# Patient Record
Sex: MALE | Race: OTHER | Employment: FULL TIME | URBAN - METROPOLITAN AREA
[De-identification: names, ages, dates, MRNs, and addresses within clinical notes are randomized per-mention and may not be internally consistent; named-entity substitution may affect disease eponyms.]

---

## 2021-10-08 ENCOUNTER — APPOINTMENT (OUTPATIENT)
Dept: CT IMAGING | Age: 61
DRG: 263 | End: 2021-10-08
Attending: EMERGENCY MEDICINE
Payer: COMMERCIAL

## 2021-10-08 ENCOUNTER — HOSPITAL ENCOUNTER (INPATIENT)
Age: 61
LOS: 6 days | Discharge: HOME | DRG: 263 | End: 2021-10-14
Attending: EMERGENCY MEDICINE | Admitting: HOSPITALIST
Payer: COMMERCIAL

## 2021-10-08 DIAGNOSIS — I86.4: ICD-10-CM

## 2021-10-08 DIAGNOSIS — N17.9 AKI (ACUTE KIDNEY INJURY) (HCC): ICD-10-CM

## 2021-10-08 DIAGNOSIS — K74.60 CIRRHOSIS OF LIVER WITHOUT ASCITES, UNSPECIFIED HEPATIC CIRRHOSIS TYPE (HCC): ICD-10-CM

## 2021-10-08 DIAGNOSIS — K92.2 ACUTE UPPER GI BLEEDING: Primary | ICD-10-CM

## 2021-10-08 LAB
ALBUMIN SERPL-MCNC: 2.8 G/DL (ref 3.5–5)
ALBUMIN/GLOB SERPL: 0.8 {RATIO} (ref 1.1–2.2)
ALP SERPL-CCNC: 51 U/L (ref 45–117)
ALT SERPL-CCNC: 32 U/L (ref 12–78)
ANION GAP SERPL CALC-SCNC: 10 MMOL/L (ref 5–15)
APTT PPP: <20 SEC (ref 22.1–31)
AST SERPL-CCNC: 23 U/L (ref 15–37)
BASE EXCESS BLD CALC-SCNC: 1.2 MMOL/L
BASOPHILS # BLD: 0 K/UL (ref 0–0.1)
BASOPHILS NFR BLD: 0 % (ref 0–1)
BILIRUB SERPL-MCNC: 0.3 MG/DL (ref 0.2–1)
BUN SERPL-MCNC: 56 MG/DL (ref 6–20)
BUN/CREAT SERPL: 41 (ref 12–20)
CA-I BLD-MCNC: 1.15 MMOL/L (ref 1.12–1.32)
CALCIUM SERPL-MCNC: 9 MG/DL (ref 8.5–10.1)
CHLORIDE BLD-SCNC: 97 MMOL/L (ref 100–108)
CHLORIDE SERPL-SCNC: 97 MMOL/L (ref 97–108)
CO2 BLD-SCNC: 27 MMOL/L (ref 19–24)
CO2 SERPL-SCNC: 29 MMOL/L (ref 21–32)
CREAT SERPL-MCNC: 1.36 MG/DL (ref 0.7–1.3)
CREAT UR-MCNC: 0.7 MG/DL (ref 0.6–1.3)
DIFFERENTIAL METHOD BLD: ABNORMAL
EOSINOPHIL # BLD: 0 K/UL (ref 0–0.4)
EOSINOPHIL NFR BLD: 0 % (ref 0–7)
ERYTHROCYTE [DISTWIDTH] IN BLOOD BY AUTOMATED COUNT: 14.5 % (ref 11.5–14.5)
GLOBULIN SER CALC-MCNC: 3.5 G/DL (ref 2–4)
GLUCOSE BLD STRIP.AUTO-MCNC: 171 MG/DL (ref 74–106)
GLUCOSE SERPL-MCNC: 232 MG/DL (ref 65–100)
HCO3 BLDA-SCNC: 26 MMOL/L
HCT VFR BLD AUTO: 21.2 % (ref 36.6–50.3)
HGB BLD-MCNC: 7.2 G/DL (ref 12.1–17)
HISTORY CHECKED?,CKHIST: NORMAL
IMM GRANULOCYTES # BLD AUTO: 0.1 K/UL (ref 0–0.04)
IMM GRANULOCYTES NFR BLD AUTO: 1 % (ref 0–0.5)
INR PPP: 1.1 (ref 0.9–1.1)
LACTATE BLD-SCNC: 7.91 MMOL/L (ref 0.4–2)
LIPASE SERPL-CCNC: 129 U/L (ref 73–393)
LYMPHOCYTES # BLD: 0.9 K/UL (ref 0.8–3.5)
LYMPHOCYTES NFR BLD: 11 % (ref 12–49)
MCH RBC QN AUTO: 29.1 PG (ref 26–34)
MCHC RBC AUTO-ENTMCNC: 34 G/DL (ref 30–36.5)
MCV RBC AUTO: 85.8 FL (ref 80–99)
MONOCYTES # BLD: 0.5 K/UL (ref 0–1)
MONOCYTES NFR BLD: 6 % (ref 5–13)
NEUTS SEG # BLD: 6.7 K/UL (ref 1.8–8)
NEUTS SEG NFR BLD: 82 % (ref 32–75)
NRBC # BLD: 0.02 K/UL (ref 0–0.01)
NRBC BLD-RTO: 0.2 PER 100 WBC
PCO2 BLDV: 43 MMHG (ref 41–51)
PH BLDV: 7.4 [PH] (ref 7.32–7.42)
PLATELET # BLD AUTO: 113 K/UL (ref 150–400)
PMV BLD AUTO: 11.9 FL (ref 8.9–12.9)
PO2 BLDV: 32 MMHG (ref 25–40)
POTASSIUM BLD-SCNC: 2.8 MMOL/L (ref 3.5–5.5)
POTASSIUM SERPL-SCNC: 2.8 MMOL/L (ref 3.5–5.1)
PROT SERPL-MCNC: 6.3 G/DL (ref 6.4–8.2)
PROTHROMBIN TIME: 11.6 SEC (ref 9–11.1)
RBC # BLD AUTO: 2.47 M/UL (ref 4.1–5.7)
SERVICE CMNT-IMP: ABNORMAL
SODIUM BLD-SCNC: 140 MMOL/L (ref 136–145)
SODIUM SERPL-SCNC: 136 MMOL/L (ref 136–145)
SPECIMEN SITE: ABNORMAL
THERAPEUTIC RANGE,PTTT: ABNORMAL SECS (ref 58–77)
TROPONIN-HIGH SENSITIVITY: 8 NG/L (ref 0–76)
WBC # BLD AUTO: 8.2 K/UL (ref 4.1–11.1)

## 2021-10-08 PROCEDURE — 74011000636 HC RX REV CODE- 636: Performed by: EMERGENCY MEDICINE

## 2021-10-08 PROCEDURE — 74011250636 HC RX REV CODE- 250/636: Performed by: EMERGENCY MEDICINE

## 2021-10-08 PROCEDURE — 83690 ASSAY OF LIPASE: CPT

## 2021-10-08 PROCEDURE — 85018 HEMOGLOBIN: CPT

## 2021-10-08 PROCEDURE — 84295 ASSAY OF SERUM SODIUM: CPT

## 2021-10-08 PROCEDURE — 84484 ASSAY OF TROPONIN QUANT: CPT

## 2021-10-08 PROCEDURE — 85730 THROMBOPLASTIN TIME PARTIAL: CPT

## 2021-10-08 PROCEDURE — 93005 ELECTROCARDIOGRAM TRACING: CPT

## 2021-10-08 PROCEDURE — 74011250636 HC RX REV CODE- 250/636: Performed by: HOSPITALIST

## 2021-10-08 PROCEDURE — 85025 COMPLETE CBC W/AUTO DIFF WBC: CPT

## 2021-10-08 PROCEDURE — 86901 BLOOD TYPING SEROLOGIC RH(D): CPT

## 2021-10-08 PROCEDURE — 80053 COMPREHEN METABOLIC PANEL: CPT

## 2021-10-08 PROCEDURE — 74178 CT ABD&PLV WO CNTR FLWD CNTR: CPT

## 2021-10-08 PROCEDURE — C9113 INJ PANTOPRAZOLE SODIUM, VIA: HCPCS | Performed by: EMERGENCY MEDICINE

## 2021-10-08 PROCEDURE — 85610 PROTHROMBIN TIME: CPT

## 2021-10-08 PROCEDURE — 99285 EMERGENCY DEPT VISIT HI MDM: CPT

## 2021-10-08 PROCEDURE — 65270000029 HC RM PRIVATE

## 2021-10-08 PROCEDURE — 74011250637 HC RX REV CODE- 250/637: Performed by: EMERGENCY MEDICINE

## 2021-10-08 PROCEDURE — 74011000258 HC RX REV CODE- 258: Performed by: HOSPITALIST

## 2021-10-08 PROCEDURE — 36415 COLL VENOUS BLD VENIPUNCTURE: CPT

## 2021-10-08 PROCEDURE — 86923 COMPATIBILITY TEST ELECTRIC: CPT

## 2021-10-08 PROCEDURE — 74011636637 HC RX REV CODE- 636/637: Performed by: EMERGENCY MEDICINE

## 2021-10-08 PROCEDURE — 96361 HYDRATE IV INFUSION ADD-ON: CPT

## 2021-10-08 PROCEDURE — 74011000250 HC RX REV CODE- 250: Performed by: EMERGENCY MEDICINE

## 2021-10-08 PROCEDURE — 96375 TX/PRO/DX INJ NEW DRUG ADDON: CPT

## 2021-10-08 PROCEDURE — 96374 THER/PROPH/DIAG INJ IV PUSH: CPT

## 2021-10-08 PROCEDURE — 96376 TX/PRO/DX INJ SAME DRUG ADON: CPT

## 2021-10-08 RX ORDER — DEXTROSE 50 % IN WATER (D50W) INTRAVENOUS SYRINGE
12.5-25 AS NEEDED
Status: DISCONTINUED | OUTPATIENT
Start: 2021-10-08 | End: 2021-10-14 | Stop reason: HOSPADM

## 2021-10-08 RX ORDER — SODIUM CHLORIDE 9 MG/ML
250 INJECTION, SOLUTION INTRAVENOUS AS NEEDED
Status: DISCONTINUED | OUTPATIENT
Start: 2021-10-08 | End: 2021-10-09

## 2021-10-08 RX ORDER — ACETAMINOPHEN 325 MG/1
325 TABLET ORAL
Status: DISCONTINUED | OUTPATIENT
Start: 2021-10-08 | End: 2021-10-14 | Stop reason: HOSPADM

## 2021-10-08 RX ORDER — POTASSIUM CHLORIDE 750 MG/1
40 TABLET, FILM COATED, EXTENDED RELEASE ORAL
Status: COMPLETED | OUTPATIENT
Start: 2021-10-08 | End: 2021-10-08

## 2021-10-08 RX ORDER — ONDANSETRON 2 MG/ML
4 INJECTION INTRAMUSCULAR; INTRAVENOUS
Status: DISCONTINUED | OUTPATIENT
Start: 2021-10-08 | End: 2021-10-14 | Stop reason: HOSPADM

## 2021-10-08 RX ORDER — OCTREOTIDE ACETATE 100 UG/ML
50 INJECTION, SOLUTION INTRAVENOUS; SUBCUTANEOUS ONCE
Status: COMPLETED | OUTPATIENT
Start: 2021-10-08 | End: 2021-10-08

## 2021-10-08 RX ORDER — SODIUM CHLORIDE 9 MG/ML
50 INJECTION, SOLUTION INTRAVENOUS CONTINUOUS
Status: DISPENSED | OUTPATIENT
Start: 2021-10-08 | End: 2021-10-12

## 2021-10-08 RX ORDER — ONDANSETRON 2 MG/ML
4 INJECTION INTRAMUSCULAR; INTRAVENOUS
Status: COMPLETED | OUTPATIENT
Start: 2021-10-08 | End: 2021-10-08

## 2021-10-08 RX ORDER — MAGNESIUM SULFATE 100 %
4 CRYSTALS MISCELLANEOUS AS NEEDED
Status: DISCONTINUED | OUTPATIENT
Start: 2021-10-08 | End: 2021-10-14 | Stop reason: HOSPADM

## 2021-10-08 RX ORDER — INSULIN LISPRO 100 [IU]/ML
INJECTION, SOLUTION INTRAVENOUS; SUBCUTANEOUS EVERY 6 HOURS
Status: DISCONTINUED | OUTPATIENT
Start: 2021-10-09 | End: 2021-10-12

## 2021-10-08 RX ORDER — METOCLOPRAMIDE HYDROCHLORIDE 5 MG/ML
10 INJECTION INTRAMUSCULAR; INTRAVENOUS
Status: COMPLETED | OUTPATIENT
Start: 2021-10-08 | End: 2021-10-08

## 2021-10-08 RX ADMIN — ONDANSETRON 4 MG: 2 INJECTION INTRAMUSCULAR; INTRAVENOUS at 19:14

## 2021-10-08 RX ADMIN — CEFTRIAXONE 1 G: 1 INJECTION, POWDER, FOR SOLUTION INTRAMUSCULAR; INTRAVENOUS at 23:25

## 2021-10-08 RX ADMIN — SODIUM CHLORIDE 1000 ML: 9 INJECTION, SOLUTION INTRAVENOUS at 20:15

## 2021-10-08 RX ADMIN — METOCLOPRAMIDE 10 MG: 5 INJECTION, SOLUTION INTRAMUSCULAR; INTRAVENOUS at 19:14

## 2021-10-08 RX ADMIN — IOPAMIDOL 100 ML: 755 INJECTION, SOLUTION INTRAVENOUS at 20:54

## 2021-10-08 RX ADMIN — SODIUM CHLORIDE 40 MG: 9 INJECTION, SOLUTION INTRAMUSCULAR; INTRAVENOUS; SUBCUTANEOUS at 19:14

## 2021-10-08 RX ADMIN — OCTREOTIDE ACETATE 50 MCG/HR: 500 INJECTION, SOLUTION INTRAVENOUS; SUBCUTANEOUS at 20:16

## 2021-10-08 RX ADMIN — SODIUM CHLORIDE 1000 ML: 9 INJECTION, SOLUTION INTRAVENOUS at 19:14

## 2021-10-08 RX ADMIN — OCTREOTIDE ACETATE 50 MCG: 100 INJECTION, SOLUTION INTRAVENOUS; SUBCUTANEOUS at 18:14

## 2021-10-08 RX ADMIN — POTASSIUM CHLORIDE 40 MEQ: 750 TABLET, FILM COATED, EXTENDED RELEASE ORAL at 22:39

## 2021-10-08 RX ADMIN — SODIUM CHLORIDE 50 ML/HR: 9 INJECTION, SOLUTION INTRAVENOUS at 23:30

## 2021-10-08 NOTE — ED PROVIDER NOTES
EMERGENCY DEPARTMENT HISTORY AND PHYSICAL EXAM      Please note that this dictation was completed with the assistance of \"Dragon\", the computer voice recognition software. Quite often unanticipated grammatical, syntax, homophones, and other interpretive errors are inadvertently transcribed by the computer software. Please disregard these errors and any errors that have escaped final proofreading. Thank you. Patient: Bg Rosas  DOS: 10/09/21  : 1960  MRN: [id number]  History of Presenting Illness     Chief Complaint   Patient presents with    Vomiting     vomited dark red blood; pt pale hypotensive en route. he ha shad two days abd pain. 79/48; has an  IV has had 900ml IV fluid     History Provided By: Patient/family/EMS (if applicable)    HPI: Bg Rosas, 59 y.o. male with past medical history as documented below presents to the ED with c/o of  Pt denies any other exacerbating or ameliorating factors. Additionally, pt specifically denies any recent fever, chills, headache, nausea, vomiting, abdominal pain, CP, SOB, lightheadedness, dizziness, numbness, weakness, lower extremity swelling, heart palpitations, urinary sxs, diarrhea, constipation, melena, hematochezia, cough, or congestion. There are no other complaints, changes or physical findings pertinent to the HPI at this time. PCP: None  Past History   Past Medical History:  Cirrhosis    Past Surgical History:  No past surgical history on file. Family History:   Family history reviewed and was non-contributory, unless specified below:  No family history on file. Social History:  Social History     Tobacco Use    Smoking status: Not on file   Substance Use Topics    Alcohol use: Not on file    Drug use: Not on file       Allergies:  No Known Allergies    Current Medications:  No current facility-administered medications on file prior to encounter. No current outpatient medications on file prior to encounter. Review of Systems   A complete ROS was reviewed by me today and all other systems negative, unless otherwise specified below:  Review of Systems   Constitutional: Negative. Negative for chills and fever. HENT: Negative. Negative for congestion and sore throat. Eyes: Negative. Respiratory: Negative. Negative for cough, chest tightness, shortness of breath and wheezing. Cardiovascular: Negative. Negative for chest pain, palpitations and leg swelling. Gastrointestinal: Negative. Negative for abdominal distention, abdominal pain, blood in stool, constipation, diarrhea, nausea and vomiting. Endocrine: Negative. Genitourinary: Negative. Negative for dysuria, flank pain, frequency, hematuria and urgency. Musculoskeletal: Negative. Negative for arthralgias, back pain and myalgias. Skin: Negative. Negative for color change and rash. Neurological: Negative. Negative for dizziness, syncope, speech difficulty, weakness, light-headedness, numbness and headaches. Hematological: Negative. Psychiatric/Behavioral: Negative. Negative for confusion and self-injury. The patient is not nervous/anxious. All other systems reviewed and are negative. Physical Exam   Physical Exam  Vitals and nursing note reviewed. Constitutional:       Appearance: He is well-developed. He is not toxic-appearing. HENT:      Head: Normocephalic and atraumatic. Mouth/Throat:      Pharynx: No posterior oropharyngeal erythema. Eyes:      Conjunctiva/sclera: Conjunctivae normal.   Cardiovascular:      Rate and Rhythm: Normal rate and regular rhythm. Heart sounds: Normal heart sounds. No murmur heard. No friction rub. No gallop. Pulmonary:      Effort: Pulmonary effort is normal. No respiratory distress. Breath sounds: Normal breath sounds. No wheezing or rales. Chest:      Chest wall: No tenderness. Abdominal:      General: Bowel sounds are normal. There is no distension. Palpations: Abdomen is soft. There is no mass. Tenderness: There is no abdominal tenderness. There is no guarding or rebound. Musculoskeletal:         General: Normal range of motion. Cervical back: Normal range of motion. Skin:     General: Skin is warm. Neurological:      General: No focal deficit present. Mental Status: He is alert and oriented to person, place, and time. Motor: No abnormal muscle tone. Psychiatric:         Behavior: Behavior is cooperative. Diagnostic Study Results     Laboratory Data  I have personally reviewed and interpreted all available laboratory results. Recent Results (from the past 24 hour(s))   CBC WITH AUTOMATED DIFF    Collection Time: 10/08/21  5:58 PM   Result Value Ref Range    WBC 8.2 4.1 - 11.1 K/uL    RBC 2.47 (L) 4.10 - 5.70 M/uL    HGB 7.2 (L) 12.1 - 17.0 g/dL    HCT 21.2 (L) 36.6 - 50.3 %    MCV 85.8 80.0 - 99.0 FL    MCH 29.1 26.0 - 34.0 PG    MCHC 34.0 30.0 - 36.5 g/dL    RDW 14.5 11.5 - 14.5 %    PLATELET 377 (L) 357 - 400 K/uL    MPV 11.9 8.9 - 12.9 FL    NRBC 0.2 (H) 0  WBC    ABSOLUTE NRBC 0.02 (H) 0.00 - 0.01 K/uL    NEUTROPHILS 82 (H) 32 - 75 %    LYMPHOCYTES 11 (L) 12 - 49 %    MONOCYTES 6 5 - 13 %    EOSINOPHILS 0 0 - 7 %    BASOPHILS 0 0 - 1 %    IMMATURE GRANULOCYTES 1 (H) 0.0 - 0.5 %    ABS. NEUTROPHILS 6.7 1.8 - 8.0 K/UL    ABS. LYMPHOCYTES 0.9 0.8 - 3.5 K/UL    ABS. MONOCYTES 0.5 0.0 - 1.0 K/UL    ABS. EOSINOPHILS 0.0 0.0 - 0.4 K/UL    ABS. BASOPHILS 0.0 0.0 - 0.1 K/UL    ABS. IMM.  GRANS. 0.1 (H) 0.00 - 0.04 K/UL    DF AUTOMATED     METABOLIC PANEL, COMPREHENSIVE    Collection Time: 10/08/21  5:58 PM   Result Value Ref Range    Sodium 136 136 - 145 mmol/L    Potassium 2.8 (L) 3.5 - 5.1 mmol/L    Chloride 97 97 - 108 mmol/L    CO2 29 21 - 32 mmol/L    Anion gap 10 5 - 15 mmol/L    Glucose 232 (H) 65 - 100 mg/dL    BUN 56 (H) 6 - 20 MG/DL    Creatinine 1.36 (H) 0.70 - 1.30 MG/DL    BUN/Creatinine ratio 41 (H) 12 - 20 GFR est AA >60 >60 ml/min/1.73m2    GFR est non-AA 53 (L) >60 ml/min/1.73m2    Calcium 9.0 8.5 - 10.1 MG/DL    Bilirubin, total 0.3 0.2 - 1.0 MG/DL    ALT (SGPT) 32 12 - 78 U/L    AST (SGOT) 23 15 - 37 U/L    Alk. phosphatase 51 45 - 117 U/L    Protein, total 6.3 (L) 6.4 - 8.2 g/dL    Albumin 2.8 (L) 3.5 - 5.0 g/dL    Globulin 3.5 2.0 - 4.0 g/dL    A-G Ratio 0.8 (L) 1.1 - 2.2     LIPASE    Collection Time: 10/08/21  5:58 PM   Result Value Ref Range    Lipase 129 73 - 393 U/L   TROPONIN-HIGH SENSITIVITY    Collection Time: 10/08/21  5:58 PM   Result Value Ref Range    Troponin-High Sensitivity 8 0 - 76 ng/L   RBC, ALLOCATE    Collection Time: 10/08/21  6:00 PM   Result Value Ref Range    HISTORY CHECKED?  Historical check performed    TYPE & SCREEN    Collection Time: 10/08/21  6:08 PM   Result Value Ref Range    Crossmatch Expiration 10/11/2021,2359     ABO/Rh(D) A POSITIVE     Antibody screen NEG     Unit number [id number]     Blood component type RC LR     Unit division 00     Status of unit ALLOCATED     Crossmatch result Compatible    EKG, 12 LEAD, INITIAL    Collection Time: 10/08/21  7:45 PM   Result Value Ref Range    Ventricular Rate 79 BPM    Atrial Rate 79 BPM    P-R Interval 152 ms    QRS Duration 150 ms    Q-T Interval 502 ms    QTC Calculation (Bezet) 575 ms    Calculated P Axis 40 degrees    Calculated R Axis -4 degrees    Calculated T Axis 20 degrees    Diagnosis       Normal sinus rhythm  Right bundle branch block  No previous ECGs available     BLOOD GAS,CHEM8,LACTIC ACID POC    Collection Time: 10/08/21  9:46 PM   Result Value Ref Range    Calcium, ionized (POC) 1.15 1.12 - 1.32 mmol/L    BICARBONATE 26 mmol/L    Base excess (POC) 1.2 mmol/L    Sample source VENOUS BLOOD      CO2, POC 27 (H) 19 - 24 MMOL/L    Sodium,  136 - 145 MMOL/L    Potassium, POC 2.8 (LL) 3.5 - 5.5 MMOL/L    Chloride, POC 97 (L) 100 - 108 MMOL/L    Glucose,  (H) 74 - 106 MG/DL    Creatinine, POC 0.7 0.6 - 1.3 MG/DL    Lactic Acid (POC) 7.91 (HH) 0.40 - 2.00 mmol/L    Critical value read back Diana Amaro MD     pH, venous (POC) 7.40 7.32 - 7.42      pCO2, venous (POC) 43.0 41 - 51 MMHG    pO2, venous (POC) 32 25 - 40 mmHg   PROTHROMBIN TIME + INR    Collection Time: 10/08/21  9:51 PM   Result Value Ref Range    INR 1.1 0.9 - 1.1      Prothrombin time 11.6 (H) 9.0 - 11.1 sec   PTT    Collection Time: 10/08/21  9:51 PM   Result Value Ref Range    aPTT <20.0 (L) 22.1 - 31.0 sec    aPTT, therapeutic range     58.0 - 77.0 SECS   HGB & HCT    Collection Time: 10/08/21 11:33 PM   Result Value Ref Range    HGB 6.2 (L) 12.1 - 17.0 g/dL    HCT 17.7 (LL) 36.6 - 50.3 %       Radiologic Studies   I have personally reviewed and interpreted all available imaging studies and agree with radiology interpretation. 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 No evidence of active GI bleed. Cirrhosis. 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 No evidence of active GI bleed. Cirrhosis. Narrative:  INDICATION: Acute GI bleed. Hematemesis. History of cirrhosis. COMPARISON: None       TECHNIQUE: Thin axial images are obtained through the abdomen and pelvis. Then,   following the uneventful intravenous administration of 100 cc Isovue-370, 5 mm   axial images were obtained through the abdomen and pelvis in hepatic arterial   and portal venous phases. CT dose reduction was achieved through use of a   standardized protocol tailored for this examination and automatic exposure   control for dose modulation. FINDINGS:    LUNG BASES: Clear. INCIDENTALLY IMAGED HEART AND MEDIASTINUM: Unremarkable. LIVER: Cirrhotic, with no visualized mass. GALLBLADDER: Unremarkable. SPLEEN: No mass. PANCREAS: No mass or ductal dilatation. ADRENALS: Unremarkable. KIDNEYS: No mass, calculus, or hydronephrosis. 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 SMALL BOWEL: No dilatation or wall thickening. No active bleed. COLON: No dilatation or wall thickening. No active bleed. APPENDIX: Normal.   PERITONEUM: No ascites or pneumoperitoneum. RETROPERITONEUM: No lymphadenopathy or aortic aneurysm. REPRODUCTIVE ORGANS: Unremarkable. URINARY BLADDER: No mass or calculus. BONES: No destructive bone lesion. ADDITIONAL COMMENTS: N/A               CXR Results  (Last 48 hours)    None        Medical Decision Making   I am the first and primary ED physician for this patient's ED visit today. I reviewed our electronic medical record system for any past medical records that may contribute to the patient's current condition, including their past medical history, surgical history, social and family history. This also includes their most recent ED visits, previous hospitalizations and prior diagnostic data. I have reviewed and summarized the most pertinent findings in my HPI and MDM.     Vital Signs Reviewed:  Patient Vitals for the past 24 hrs:   Temp Pulse Resp BP SpO2   10/09/21 0030  93 15 105/64    10/09/21 0015  94 15 97/62    10/09/21 0000  93 14 109/67    10/08/21 2345  92 14 104/66 92 %   10/08/21 2330  93 15 108/68    10/08/21 2329  92 18  94 %   10/08/21 2315  88 14 (!) 100/57 92 %   10/08/21 2300  88 14 108/61 95 %   10/08/21 2245  92 17 108/67 97 %   10/08/21 2230  83 14 (!) 105/55 94 %   10/08/21 2215  82 18 (!) 93/59 96 %   10/08/21 2200  81 16 99/63 93 %   10/08/21 2145  85 17 97/65 91 %   10/08/21 2137  82 15  95 %   10/08/21 2134  82 13 96/62 93 %   10/08/21 2031  82 15  91 %   10/08/21 2030  82 14 104/63    10/08/21 1900  74 16 100/64 93 %   10/08/21 1845  74 13 (!) 82/72    10/08/21 1830  76 15 (!) 123/55 95 %   10/08/21 1757 97.6 °F (36.4 °C)    100 %   10/08/21 1738 97.8 °F (36.6 °C) 70 18 (!) 79/48 100 % Records Reviewed: Nursing Notes, Old Medical Records, Previous electrocardiograms, Previous Radiology Studies and Previous Laboratory Studies, EMS reports    Provider Notes (Medical Decision Making):       ED Course:   Initial assessment performed. I discussed presenting problems and concerns, and my formulated plan for today's visit with the patient and any available family members. I have encouraged them to ask questions as they arise throughout the visit.    Social History     Tobacco Use    Smoking status: Not on file   Substance Use Topics    Alcohol use: Not on file    Drug use: Not on file       ED Orders Placed:  Orders Placed This Encounter    CT ABD PELV W WO CONT    CBC WITH AUTOMATED DIFF    METABOLIC PANEL, COMPREHENSIVE    LIPASE    TROPONIN-HIGH SENSITIVITY    OCCULT BLOOD, STOOL    PT    PTT    METABOLIC PANEL, BASIC    CBC W/O DIFF    MAGNESIUM    PHOSPHORUS    HGB & HCT    DIET NPO    TRANSFUSE PACKED RBC'S, 1 Units    POC LACTIC ACID    Vital signs    CARDIAC MONITORING    POC GLUCOSE    VITAL SIGNS PER UNIT ROUTINE    OUT OF BED WITH ASSISTANCE    IP CONSULT TO GASTROENTEROLOGY    IP CONSULT TO GASTROENTEROLOGY    POC CHEM8    BLOOD GAS,CHEM8,LACTIC ACID POC    EKG 12 LEAD INITIAL    RBC, ALLOCATE, 1 Units Date needed for transfusion: 10/8/2021    TYPE & SCREEN    INSERT PERIPHERAL IV ONE TIME STAT    INSERT PERIPHERAL IV ONE TIME STAT    pantoprazole (PROTONIX) 40 mg in 0.9% sodium chloride 10 mL injection    sodium chloride 0.9 % bolus infusion 1,000 mL    0.9% sodium chloride infusion 250 mL    0.9% sodium chloride infusion 250 mL    ondansetron (ZOFRAN) injection 4 mg    octreotide (SANDOSTATIN) 500 mcg in 0.9% sodium chloride 500 mL infusion    sodium chloride 0.9 % bolus infusion 1,000 mL    metoclopramide HCl (REGLAN) injection 10 mg    octreotide (SANDOSTATIN) injection 50 mcg    iopamidoL (ISOVUE-370) 76 % injection 100 mL    pantoprazole (PROTONIX) 40 mg in 0.9% sodium chloride 10 mL injection    potassium chloride SR (KLOR-CON 10) tablet 40 mEq    insulin lispro (HUMALOG) injection    glucose chewable tablet 16 g    dextrose (D50W) injection syrg 12.5-25 g    glucagon (GLUCAGEN) injection 1 mg    acetaminophen (TYLENOL) tablet 325 mg    ondansetron (ZOFRAN) injection 4 mg    cefTRIAXone (ROCEPHIN) 1 g in 0.9% sodium chloride (MBP/ADV) 50 mL MBP    0.9% sodium chloride infusion    INITIAL PHYSICIAN ORDER: INPATIENT Telemetry; Yes; 3.  Patient receiving treatment that can only be provided in an inpatient setting (further clarification in H&P documentation)    Pharmacy to Dose Consult       ED Medications Administered During ED Course:  Medications   0.9% sodium chloride infusion 250 mL (has no administration in time range)   0.9% sodium chloride infusion 250 mL (has no administration in time range)   octreotide (SANDOSTATIN) 500 mcg in 0.9% sodium chloride 500 mL infusion (50 mcg/hr IntraVENous New Bag 10/8/21 2016)   pantoprazole (PROTONIX) 40 mg in 0.9% sodium chloride 10 mL injection (has no administration in time range)   insulin lispro (HUMALOG) injection (has no administration in time range)   glucose chewable tablet 16 g (has no administration in time range)   dextrose (D50W) injection syrg 12.5-25 g (has no administration in time range)   glucagon (GLUCAGEN) injection 1 mg (has no administration in time range)   acetaminophen (TYLENOL) tablet 325 mg (has no administration in time range)   ondansetron (ZOFRAN) injection 4 mg (has no administration in time range)   cefTRIAXone (ROCEPHIN) 1 g in 0.9% sodium chloride (MBP/ADV) 50 mL MBP (0 g IntraVENous IV Completed 10/8/21 2355)   0.9% sodium chloride infusion (50 mL/hr IntraVENous New Bag 10/8/21 2330)   pantoprazole (PROTONIX) 40 mg in 0.9% sodium chloride 10 mL injection (40 mg IntraVENous Given 10/8/21 1914)   sodium chloride 0.9 % bolus infusion 1,000 mL (0 mL IntraVENous IV Completed 10/8/21 2218)   ondansetron (ZOFRAN) injection 4 mg (4 mg IntraVENous Given 10/8/21 1914)   sodium chloride 0.9 % bolus infusion 1,000 mL (0 mL IntraVENous IV Completed 10/8/21 2006)   metoclopramide HCl (REGLAN) injection 10 mg (10 mg IntraVENous Given 10/8/21 1914)   octreotide (SANDOSTATIN) injection 50 mcg (50 mcg IntraVENous Given 10/8/21 1814)   iopamidoL (ISOVUE-370) 76 % injection 100 mL (100 mL IntraVENous Given 10/8/21 2054)   potassium chloride SR (KLOR-CON 10) tablet 40 mEq (40 mEq Oral Given 10/8/21 2239)        Progress Note:  Consult Note:  Phill Hooker MD spoke with Dr. Dalila Emanuel  Specialty: GI   Discussed pt's hx, disposition, and available diagnostic and imaging results. Reviewed care plans. Agree with management and plan thus far. Consultant will evaluate pt. Agree with Octreotide bolus, gtt, IV Protonix, resuscitation. Consult Note:  Phill Hooker MD spoke with   Specialty: Hospitalist  Discussed pt's hx, disposition, and available diagnostic and imaging results. Reviewed care plans. Agree with management and plan thus far. Consultant will evaluate pt.    minutes of critical care time with this patient. This is time spent at this critically ill patient's bedside actively involved in patient care as well as the coordination of care and discussions with the patient's family. This includes time involved in lab review, consultations with specialist, family decision-making, bedside attention and documentation. During this entire length of time I was immediately available to the patient. This does not include time spent on separately reported billable procedures. Critical Care: The reason for providing this level of medical care for this critically-ill patient was due to a critical illness that impaired one or more vital organ systems, such that there was a high probability of imminent or life-threatening deterioration in the patient's condition.  This care involved the highest level of preparedness to intervene urgently. This care involved high complexity decision making to assess, manipulate, and support vital system functions, to treat this degree of vital organ system failure, and to prevent further life threatening deterioration of the patients condition requiring frequent assessments and interventions. Pamela Torres MD    ADMIT  Given the patient's current clinical presentation, I have a high level of concern for decompensation if discharged from the emergency department. Patient is being admitted to the hospital.  The results of their tests and reasons for their admission have been discussed with them and/or available family. They convey agreement and understanding for the need to be admitted and for their admission diagnosis. Consultation has been made with the inpatient physician specialist for hospitalization. Diagnosis   Clinical Impression:  1. Acute upper GI bleeding    2. [place] (acute kidney injury) ([facility] [address]    3. Cirrhosis of liver without ascites, unspecified hepatic cirrhosis type (HCC)      Attestation:  Sj Wheatley MD, am the attending of record for this patient. I personally performed the services described in this documentation on this date, 10/8/2021 for patientBg. I have reviewed the chart and verified that the record is accurate and complete.

## 2021-10-09 ENCOUNTER — ANESTHESIA EVENT (OUTPATIENT)
Dept: SURGERY | Age: 61
DRG: 263 | End: 2021-10-09
Payer: COMMERCIAL

## 2021-10-09 ENCOUNTER — ANESTHESIA (OUTPATIENT)
Dept: SURGERY | Age: 61
DRG: 263 | End: 2021-10-09
Payer: COMMERCIAL

## 2021-10-09 LAB
ANION GAP SERPL CALC-SCNC: 0 MMOL/L (ref 5–15)
ATRIAL RATE: 79 BPM
BUN SERPL-MCNC: 42 MG/DL (ref 6–20)
BUN/CREAT SERPL: 38 (ref 12–20)
CALCIUM SERPL-MCNC: 7.7 MG/DL (ref 8.5–10.1)
CALCULATED P AXIS, ECG09: 40 DEGREES
CALCULATED R AXIS, ECG10: -4 DEGREES
CALCULATED T AXIS, ECG11: 20 DEGREES
CHLORIDE SERPL-SCNC: 108 MMOL/L (ref 97–108)
CO2 SERPL-SCNC: 33 MMOL/L (ref 21–32)
COVID-19 RAPID TEST, COVR: NOT DETECTED
CREAT SERPL-MCNC: 1.12 MG/DL (ref 0.7–1.3)
DIAGNOSIS, 93000: NORMAL
ERYTHROCYTE [DISTWIDTH] IN BLOOD BY AUTOMATED COUNT: 15.7 % (ref 11.5–14.5)
GLUCOSE BLD STRIP.AUTO-MCNC: 192 MG/DL (ref 65–117)
GLUCOSE BLD STRIP.AUTO-MCNC: 197 MG/DL (ref 65–117)
GLUCOSE BLD STRIP.AUTO-MCNC: 213 MG/DL (ref 65–117)
GLUCOSE BLD STRIP.AUTO-MCNC: 216 MG/DL (ref 65–117)
GLUCOSE BLD STRIP.AUTO-MCNC: 225 MG/DL (ref 65–117)
GLUCOSE SERPL-MCNC: 198 MG/DL (ref 65–100)
HCT VFR BLD AUTO: 16.8 % (ref 36.6–50.3)
HCT VFR BLD AUTO: 17.7 % (ref 36.6–50.3)
HCT VFR BLD AUTO: 18.6 % (ref 36.6–50.3)
HEMOCCULT STL QL: POSITIVE
HGB BLD-MCNC: 5.6 G/DL (ref 12.1–17)
HGB BLD-MCNC: 6.2 G/DL (ref 12.1–17)
HGB BLD-MCNC: 6.3 G/DL (ref 12.1–17)
HISTORY CHECKED?,CKHIST: NORMAL
MAGNESIUM SERPL-MCNC: 2 MG/DL (ref 1.6–2.4)
MCH RBC QN AUTO: 29 PG (ref 26–34)
MCHC RBC AUTO-ENTMCNC: 33.9 G/DL (ref 30–36.5)
MCV RBC AUTO: 85.7 FL (ref 80–99)
NRBC # BLD: 0.06 K/UL (ref 0–0.01)
NRBC BLD-RTO: 1.4 PER 100 WBC
P-R INTERVAL, ECG05: 152 MS
PHOSPHATE SERPL-MCNC: 2.6 MG/DL (ref 2.6–4.7)
PLATELET # BLD AUTO: 85 K/UL (ref 150–400)
PMV BLD AUTO: 11.5 FL (ref 8.9–12.9)
POTASSIUM SERPL-SCNC: 3.3 MMOL/L (ref 3.5–5.1)
Q-T INTERVAL, ECG07: 502 MS
QRS DURATION, ECG06: 150 MS
QTC CALCULATION (BEZET), ECG08: 575 MS
RBC # BLD AUTO: 2.17 M/UL (ref 4.1–5.7)
SERVICE CMNT-IMP: ABNORMAL
SODIUM SERPL-SCNC: 141 MMOL/L (ref 136–145)
SOURCE, COVRS: NORMAL
VENTRICULAR RATE, ECG03: 79 BPM
WBC # BLD AUTO: 4.3 K/UL (ref 4.1–11.1)

## 2021-10-09 PROCEDURE — 87635 SARS-COV-2 COVID-19 AMP PRB: CPT

## 2021-10-09 PROCEDURE — 30233N1 TRANSFUSION OF NONAUTOLOGOUS RED BLOOD CELLS INTO PERIPHERAL VEIN, PERCUTANEOUS APPROACH: ICD-10-PCS | Performed by: INTERNAL MEDICINE

## 2021-10-09 PROCEDURE — 74011250636 HC RX REV CODE- 250/636: Performed by: NURSE PRACTITIONER

## 2021-10-09 PROCEDURE — 82962 GLUCOSE BLOOD TEST: CPT

## 2021-10-09 PROCEDURE — 74011000258 HC RX REV CODE- 258: Performed by: INTERNAL MEDICINE

## 2021-10-09 PROCEDURE — C9113 INJ PANTOPRAZOLE SODIUM, VIA: HCPCS | Performed by: HOSPITALIST

## 2021-10-09 PROCEDURE — 74011000250 HC RX REV CODE- 250: Performed by: HOSPITALIST

## 2021-10-09 PROCEDURE — 84100 ASSAY OF PHOSPHORUS: CPT

## 2021-10-09 PROCEDURE — 2709999900 HC NON-CHARGEABLE SUPPLY

## 2021-10-09 PROCEDURE — P9016 RBC LEUKOCYTES REDUCED: HCPCS

## 2021-10-09 PROCEDURE — 74011250636 HC RX REV CODE- 250/636: Performed by: EMERGENCY MEDICINE

## 2021-10-09 PROCEDURE — 74011636637 HC RX REV CODE- 636/637: Performed by: EMERGENCY MEDICINE

## 2021-10-09 PROCEDURE — 74011250636 HC RX REV CODE- 250/636: Performed by: HOSPITALIST

## 2021-10-09 PROCEDURE — 83735 ASSAY OF MAGNESIUM: CPT

## 2021-10-09 PROCEDURE — 82272 OCCULT BLD FECES 1-3 TESTS: CPT

## 2021-10-09 PROCEDURE — 74011250636 HC RX REV CODE- 250/636: Performed by: INTERNAL MEDICINE

## 2021-10-09 PROCEDURE — 65660000000 HC RM CCU STEPDOWN

## 2021-10-09 PROCEDURE — 85027 COMPLETE CBC AUTOMATED: CPT

## 2021-10-09 PROCEDURE — 36430 TRANSFUSION BLD/BLD COMPNT: CPT

## 2021-10-09 PROCEDURE — 80048 BASIC METABOLIC PNL TOTAL CA: CPT

## 2021-10-09 PROCEDURE — 36415 COLL VENOUS BLD VENIPUNCTURE: CPT

## 2021-10-09 PROCEDURE — 85018 HEMOGLOBIN: CPT

## 2021-10-09 PROCEDURE — 74011636637 HC RX REV CODE- 636/637: Performed by: HOSPITALIST

## 2021-10-09 RX ORDER — BISMUTH SUBSALICYLATE 262 MG/1
2 TABLET, CHEWABLE ORAL
COMMUNITY

## 2021-10-09 RX ORDER — POTASSIUM CHLORIDE 14.9 MG/ML
10 INJECTION INTRAVENOUS
Status: DISCONTINUED | OUTPATIENT
Start: 2021-10-09 | End: 2021-10-09 | Stop reason: SDUPTHER

## 2021-10-09 RX ORDER — ASPIRIN 81 MG/1
81 TABLET ORAL DAILY
COMMUNITY
End: 2021-10-14

## 2021-10-09 RX ORDER — PIOGLITAZONEHYDROCHLORIDE 45 MG/1
45 TABLET ORAL DAILY
COMMUNITY

## 2021-10-09 RX ORDER — MORPHINE SULFATE 2 MG/ML
1 INJECTION, SOLUTION INTRAMUSCULAR; INTRAVENOUS ONCE
Status: COMPLETED | OUTPATIENT
Start: 2021-10-09 | End: 2021-10-09

## 2021-10-09 RX ORDER — TORSEMIDE 20 MG/1
20 TABLET ORAL DAILY
COMMUNITY

## 2021-10-09 RX ORDER — POTASSIUM CHLORIDE 7.45 MG/ML
10 INJECTION INTRAVENOUS
Status: COMPLETED | OUTPATIENT
Start: 2021-10-09 | End: 2021-10-10

## 2021-10-09 RX ORDER — SODIUM CHLORIDE 9 MG/ML
250 INJECTION, SOLUTION INTRAVENOUS AS NEEDED
Status: DISCONTINUED | OUTPATIENT
Start: 2021-10-09 | End: 2021-10-09

## 2021-10-09 RX ORDER — POTASSIUM CHLORIDE 7.45 MG/ML
10 INJECTION INTRAVENOUS
Status: COMPLETED | OUTPATIENT
Start: 2021-10-09 | End: 2021-10-09

## 2021-10-09 RX ORDER — ATORVASTATIN CALCIUM 40 MG/1
40 TABLET, FILM COATED ORAL DAILY
COMMUNITY

## 2021-10-09 RX ORDER — AMLODIPINE BESYLATE 10 MG/1
10 TABLET ORAL DAILY
COMMUNITY

## 2021-10-09 RX ORDER — SODIUM CHLORIDE 9 MG/ML
250 INJECTION, SOLUTION INTRAVENOUS AS NEEDED
Status: DISCONTINUED | OUTPATIENT
Start: 2021-10-09 | End: 2021-10-10 | Stop reason: ALTCHOICE

## 2021-10-09 RX ORDER — OMEPRAZOLE 40 MG/1
40 CAPSULE, DELAYED RELEASE ORAL DAILY
COMMUNITY

## 2021-10-09 RX ORDER — METOCLOPRAMIDE 5 MG/1
5 TABLET ORAL
COMMUNITY

## 2021-10-09 RX ORDER — METFORMIN HYDROCHLORIDE 1000 MG/1
1000 TABLET ORAL 2 TIMES DAILY WITH MEALS
COMMUNITY

## 2021-10-09 RX ORDER — BETAMETHASONE DIPROPIONATE 0.5 MG/G
1 CREAM TOPICAL
COMMUNITY

## 2021-10-09 RX ADMIN — SODIUM CHLORIDE 40 MG: 9 INJECTION INTRAMUSCULAR; INTRAVENOUS; SUBCUTANEOUS at 21:25

## 2021-10-09 RX ADMIN — POTASSIUM CHLORIDE 10 MEQ: 10 INJECTION, SOLUTION INTRAVENOUS at 14:09

## 2021-10-09 RX ADMIN — MORPHINE SULFATE 1 MG: 2 INJECTION, SOLUTION INTRAMUSCULAR; INTRAVENOUS at 21:24

## 2021-10-09 RX ADMIN — SODIUM CHLORIDE 40 MG: 9 INJECTION INTRAMUSCULAR; INTRAVENOUS; SUBCUTANEOUS at 09:24

## 2021-10-09 RX ADMIN — OCTREOTIDE ACETATE 50 MCG/HR: 500 INJECTION, SOLUTION INTRAVENOUS; SUBCUTANEOUS at 21:26

## 2021-10-09 RX ADMIN — CEFTRIAXONE 1 G: 1 INJECTION, POWDER, FOR SOLUTION INTRAMUSCULAR; INTRAVENOUS at 22:56

## 2021-10-09 RX ADMIN — POTASSIUM CHLORIDE 10 MEQ: 10 INJECTION, SOLUTION INTRAVENOUS at 12:20

## 2021-10-09 RX ADMIN — POTASSIUM CHLORIDE 10 MEQ: 10 INJECTION, SOLUTION INTRAVENOUS at 21:42

## 2021-10-09 RX ADMIN — INSULIN LISPRO 2 UNITS: 100 INJECTION, SOLUTION INTRAVENOUS; SUBCUTANEOUS at 18:21

## 2021-10-09 RX ADMIN — OCTREOTIDE ACETATE 50 MCG/HR: 500 INJECTION, SOLUTION INTRAVENOUS; SUBCUTANEOUS at 09:01

## 2021-10-09 RX ADMIN — SODIUM CHLORIDE 50 ML/HR: 9 INJECTION, SOLUTION INTRAVENOUS at 19:34

## 2021-10-09 RX ADMIN — POTASSIUM CHLORIDE 10 MEQ: 10 INJECTION, SOLUTION INTRAVENOUS at 23:03

## 2021-10-09 RX ADMIN — INSULIN LISPRO 2 UNITS: 100 INJECTION, SOLUTION INTRAVENOUS; SUBCUTANEOUS at 07:05

## 2021-10-09 NOTE — PROGRESS NOTES
Bedside and Verbal shift change report given to Arianna RUVALCABA (oncoming nurse) by Cole Forman (offgoing nurse). Report included the following information SBAR, Kardex, MAR, Recent Results and Cardiac Rhythm [id number].

## 2021-10-09 NOTE — PROGRESS NOTES
Pharmacy Medication Reconciliation     Clarified PTA med list with patient's prescription bottles (at bedside). PTA medication list was corrected to the following:     Prior to Admission Medications   Prescriptions Last Dose Informant Taking? amLODIPine (NORVASC) 10 mg tablet  Other Yes   Sig: Take 10 mg by mouth daily. aspirin delayed-release 81 mg tablet  Other Yes   Sig: Take 81 mg by mouth daily. atorvastatin (LIPITOR) 40 mg tablet  Other Yes   Sig: Take 40 mg by mouth daily. betamethasone dipropionate (DIPROSONE) 0.05 % topical cream  Other Yes   Sig: Apply 1 Each to affected area two (2) times daily as needed for Skin Irritation or Itching. bismuth subsalicylate (PEPTO-BISMOL) 262 mg chew  Other Yes   Sig: Take 2 Tablets by mouth every three (3) hours as needed for Nausea or Diarrhea.   empagliflozin (Jardiance) 10 mg tablet  Other Yes   Sig: Take 10 mg by mouth daily. metFORMIN (GLUCOPHAGE) 1,000 mg tablet 10/9/2021 at Unknown time Other Yes   Sig: Take 1,000 mg by mouth two (2) times daily (with meals). metoclopramide HCl (Reglan) 5 mg tablet  Other Yes   Sig: Take 5 mg by mouth nightly. omeprazole (PRILOSEC) 40 mg capsule  Other Yes   Sig: Take 40 mg by mouth daily. pioglitazone (ACTOS) 45 mg tablet  Other Yes   Sig: Take 45 mg by mouth daily. torsemide (DEMADEX) 20 mg tablet  Other Yes   Sig: Take 20 mg by mouth daily.       Facility-Administered Medications: None        Thank you,  Brigitte Yee, MARID

## 2021-10-09 NOTE — PROGRESS NOTES
GI update    Lab Results   Component Value Date/Time    WBC 8.2 10/08/2021 05:58 PM    HGB 5.6 (LL) 10/09/2021 11:27 AM    HCT 16.8 (LL) 10/09/2021 11:27 AM    PLATELET 217 (L) 35/20/8306 05:58 PM    MCV 85.8 10/08/2021 05:58 PM       As above, hgb now 5.6 g/dL  Will need hgb > 7 g/dL for sedation    We also need BMP to show K+ as last 2.8     Per nursing hard stick so they could only get enough blood for CBC (which makes me question reliability of cbc, though patient has had melena today). EGD will need to wait for medical stability    ARS    ADDENDUM 1600:    Have talked to OR, anesthesia, nursing a few times throughout the day to get him set up for EGD today. Last dark stool this AM.  Labs cannot be drawn while blood transfusing, so we'll have to wait until his unit completes to check labs, and he requires the PICC team to draw blood  He's been hemodynamically stable in the interim.     Will get labs after blood  Continue NPO  EGD tomorrow unless emergent    ARS

## 2021-10-09 NOTE — PROGRESS NOTES
1235: Patient arrived to PCU room 2272 from gen surg. 1330: 1 unit PRBC infusion begun. 1640: Transfusion complete. Will check labs in 1 hr    1800: PICC team at bedside to draw labs. 1900: Noted repeat hgb 6.3. Night shift RN aware and will page MD for another unit of blood. Goal Hgb >7 for EGD tomorrow. End of Shift Note    Bedside shift change report given to Jarod James (oncoming nurse) by Cameron Duke RN (offgoing nurse). Report included the following information SBAR, Kardex, Intake/Output, MAR and Recent Results    Shift worked:  7a-7p     Shift summary and any significant changes:    See above. Concerns for physician to address:    Zone phone for oncoming shift:       Activity:  Activity Level: Up with Assistance  Number times ambulated in hallways past shift: 0  Number of times OOB to chair past shift: 0    Cardiac:   Cardiac Monitoring: Yes      Cardiac Rhythm: Sinus Rhythm    Access:   Current line(s): PIV     Genitourinary:   Urinary status: voiding    Respiratory:   O2 Device: None (Room air)  Chronic home O2 use?: NO  Incentive spirometer at bedside: NO     GI:  Last Bowel Movement Date: 10/09/21  Current diet:  DIET NPO  Passing flatus: YES  Tolerating current diet: NO       Pain Management:   Patient states pain is manageable on current regimen: YES    Skin:  Christopher Score: 19  Interventions: increase time out of bed    Patient Safety:  Fall Score:  Total Score: 3  Interventions: bed/chair alarm and pt to call before getting OOB  High Fall Risk: Yes    Length of Stay:  Expected LOS: - - -  Actual LOS: 1      Cameron Duke RN

## 2021-10-09 NOTE — ED NOTES
Patient is being transferred to [address], Room # 3669. Report given to Navi Amaro on Terrance Ramal Del-Benitez for routine progression of care. Report consisted of the following information SBAR, ED Summary, Procedure Summary, Intake/Output, MAR, Recent Results, Med Rec Status and Cardiac Rhythm nsr. Patient transferred to receiving unit by: ed tech (RN or tech name). Outstanding consults needed: Yes    Next labs due: Yes    The following personal items will be sent with the patient during transfer to the floor: All valuables:    Cardiac monitoring ordered: Yes    The following CURRENT information was reported to the receiving RN:    Code status: Full Code at time of transfer    Last set of vital signs:  Vital Signs  Level of Consciousness: Alert (0) (10/08/21 1757)  Temp: 97.6 °F (36.4 °C) (10/08/21 1757)  Temp Source: Oral (10/08/21 1757)  Pulse (Heart Rate): 93 (10/09/21 0030)  Cardiac Rhythm: Sinus Rhythm (10/08/21 2100)  Resp Rate: 15 (10/09/21 0030)  BP: 105/64 (10/09/21 0030)  MAP (Monitor): 76 (10/09/21 0030)  MAP (Calculated): 78 (10/09/21 0030)  MEWS Score: 3 (10/08/21 1738)         Oxygen Therapy  O2 Sat (%): 92 % (10/08/21 2345)  Pulse via Oximetry: 93 beats per minute (10/09/21 0030)  O2 Device: Nasal cannula (10/08/21 1738)      Last pain assessment:  Pain 1  Pain Scale 1: Numeric (0 - 10)  Pain Intensity 1: 7  Patient Stated Pain Goal: 0  Pain Reassessment 1: Yes      Wounds: No     Urinary catheter: voiding  Is there a jaramillo order: No     LDAs:       Peripheral IV 10/08/21 Left Antecubital (Active)       Peripheral IV 10/08/21 Right; Inner Forearm (Active)   Site Assessment Clean, dry, & intact 10/08/21 2135   Phlebitis Assessment 0 10/08/21 2135   Infiltration Assessment 0 10/08/21 2135   Dressing Status New 10/08/21 2135   Dressing Type Transparent;Tape 10/08/21 2135   Hub Color/Line Status Green;Patent; Flushed 10/08/21 2135   Action Taken Blood drawn 10/08/21 2135         Opportunity for questions and clarification was provided.     Beata Sousa RN

## 2021-10-09 NOTE — ED NOTES
Future appt scheduled 05/06/2021  Last appt 11/05/2020 Per blood bank pt not eligible for unit of blood due to shortage and HGB above 7, not actively bleeding; MD Vanessa Lackey made aware, pt and family member aware.

## 2021-10-09 NOTE — H&P
Hospitalist Admission Note    NAME: Chuy Chandler Del-Benitez   :  1960   MRN:  [id number]     Date/Time:  10/8/2021 10:10 PM    Patient PCP: None  ______________________________________________________________________  Given the patient's current clinical presentation, I have a high level of concern for decompensation if discharged from the emergency department. Complex decision making was performed, which includes reviewing the patient's available past medical records, laboratory results, and x-ray films. My assessment of this patient's clinical condition and my plan of care is as follows. 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  CTX x 7 days for prophylaxis   CT: Large gastric varices along the gastric fundus. No evidence of active GI bleed. Cirrhosis. DAYTON  Baseline creatinine 0.9  Gentle hydration  Monitor closely  Adjust medications for GFR    DM type II   hold home p.o. medications for n.p.o. Cover with sliding scale as needed  Closely monitor for hypoglycemia    Hypertension  BP low side  Hold home medications    Hyperlipidemia  Continue statin on discharge    Thrombocytopenia  Mild, likely due to cirrhosis  Monitor closely    Son was updated at bedside on nature of gastric varices and high risk of re bleeding , all questions were answered       Code Status: Full code  Surrogate Decision Maker: Family  Son Dakota Pi [phone]    DVT Prophylaxis: SCD  GI Prophylaxis: PPI    Baseline:  Independent, lives with wife       Subjective:   CHIEF COMPLAINT:  Vomiting bleeding     HISTORY OF PRESENT ILLNESS:     Ange Wagner Alison is a 59 y.o.  male who presents with above complaints. Patient with known history of liver cirrhosis and being followed up by GI outpatient. While at work today he is started vomiting dark red blood. Per EMS patient was pale, diaphoretic, hypotensive with blood pressure in mid 70s. He was given IV fluids by EMS. Blood pressure improved while he is in ED. No more episodes of vomiting blood. ED provider discussed case with GI on-call who recommended to start patient on PPI and octreotide drip. Patient denies any dyspnea, chest pain, dizziness. He denies any blood in stool recently or dark stools. He admits to generalized abdominal pain since yesterday. He was not feeling well since yesterday. No fever or chills at home. We were asked to admit for work up and evaluation of the above problems. 1) EGD 5/12/14 revealing gastritis with biopsies negative for H pylori  2) Colonoscopy 5/12/14 revealing TC, AC and SC polyps. They were all tubular adenomas but the SC polyp had HGD  3) Flex sig 8/14/14 was normal  4) CT of the abdomen and pelvis 3/2015CONCLUSION:              No definite etiology for the patient's abdominal pain is identified. Specifically, no evidence of bowel obstruction.              Cirrhotic liver morphology with possible hypodense mass right hepatic lobe. Recommend further followup imaging with ultrasound or MRI.              Splenomegaly likely related to portal hypertension.              Bladder wall which may be partially related to decompressed state, however recommend correlation with urinalysis recommended to exclude infection. 5) Colonoscopy 10/26/15 revealed cecal and TC polyps. The polyps came back as tubular adenomas   6) AFP normal, URSZULA -, ASMA -, ceruloplasm low at 16 (normal >18), viral serologies - fir HBV and HCV  7) CT of the abdomen and pelvis 3/13/17 CONCLUSION:              1.  There is mild fluid distention of small and large bowel, suggestive of enterocolitis.             2. Colonic diverticulosis without focal diverticulitis.             3. Bladder wall thickening, in part attributable to decompressed state, raises suspicion for cystitis.             4. Cirrhosis with trace ascites. 8) Colonoscopy 3/13/17 revealed a TC polyp that came back as vegetable matter.  The prep was poor  9) Liver biopsy 7/26/17 LIVER, NEEDLE CORE BIOPSY:       Findings consistent with late-stage cirrhotic non-alcoholic  Steatohepatitis. 10) AFP 7/17 3.6, 2/20 4.01 3/21 3.4  11) U/S 2/20/18CONCLUSION:              1. Nodular hepatic contour, consistent with cirrhosis.              2. Hepatosplenomegaly.              3. No acute abnormality is evident, however, examination is limited as pancreas was not visualized. 12) EGD 8/27/18 revealed mild antral gastritis with biopsies negative for H pylori  13) Colonoscopy 8/27/18 revealed a lipomatous TC fold that came back as benign colonic mucosa  14) U/S 7/9/1 CONCLUSION:              Hepatomegaly and steatosis. 9   15) CT of the abdomen and pelvis 2/4/20CONCLUSION:              1. There is mild fluid distention of colon and some segments of small bowel, suggestive of enterocolitis.              2. Cirrhosis with splenomegaly and portosystemic collaterals.               3. Mild circumferential bladder wall thickening may relate to chronic outlet obstruction in view of prostamegaly. Consider correlation with urinalysis for potential urinary tract infection. 16) U/S 8/26/20 CONCLUSION:    1. No acute abnormality is seen, although assessment is limited as pancreas was not visualized. 2 . Hepatomegaly. Nodular hepatic contour suggests cirrhosis      Last GI note:    1) History of Colon Polyps--The patient is currently due for a repeat colonoscopy 8/2023. 2) Cirrhosis--The patient is felt to have cirrhosis secondary to WALTERS without complications to date.  He is undergoing surveillance for [facility] [address] with AFP and U/S on a rotating basis with most recent AFP that was normal. As there are no complications at this time, no interventions are necessary. Repeat U/S in 6 months with office visit after that    3) GERD--The patient's symptoms are currently well controlled on PPI and I have recommended that the patient continue their present care along with lifestyle and dietary modifications for GERD which were discussed with the patient. The patient has previously undergone an EGD that did not reveal Sheldon's esophagus. 4) Low back pain--encourage the patient to follow-up with his PCP regarding this issue. Plan:  1) U/S in 6 months  2) Repeat colonoscopy 8/2023  3) Office visit in 6 months    Electronically signed by: Ana Castro MD MPH 3/25/2021 3:22 PM  March 25, 2021      WBC 3.3 (L) 11/12/2020   HGB 13.0 (L) 11/12/2020   HCT 37.6 (L) 11/12/2020   MCV 87.1 11/12/2020    (L) 11/12/2020     Chemistry     Component Value Date/Time    11/12/2020 0944    11/26/2013 1640   K 3.7 11/12/2020 0944   K 3.9 11/26/2013 1640   CL 99 (L) 11/12/2020 0944   CL 98 11/26/2013 1640   CO2 29 11/12/2020 0944   CO2 32 11/26/2013 1640   BUN 15.0 11/12/2020 0944   BUN 11 11/26/2013 1640   CREATININE 0.93 11/12/2020 0944   CREATININE 0.78 11/26/2013 1640    11/12/2020 0944    (H) 11/26/2013 1640    PMH: DM, HTN, HLP, liver cirrhosis     Past surgical history: none per pt     Social History     Tobacco Use    Smoking status: Not on file   Substance Use Topics    Alcohol use: Not on file        Family history: htn     No Known Allergies     Prior to Admission medications    Not on File   asa, lipitor 40, glipizide 10 mg, metformin 1000 bid, Reglan 5 mg bedtime , prilosec 40 mg, actos,  45 mg, torsemide 20 mg daily    REVIEW OF SYSTEMS:     I am not able to complete the review of systems because:    The patient is intubated and sedated    The patient has altered mental status due to his acute medical problems    The patient has baseline aphasia from prior stroke(s)    The patient has baseline dementia and is not reliable historian    The patient is in acute medical distress and unable to provide information           Total of 12 systems reviewed as follows:       POSITIVE= underlined text  Negative = text not underlined  General:  fever, chills, sweats, generalized weakness, weight loss/gain,      loss of appetite   Eyes:    blurred vision, eye pain, loss of vision, double vision  ENT:    rhinorrhea, pharyngitis   Respiratory:   cough, sputum production, SOB, BERMAN, wheezing, pleuritic pain   Cardiology:   chest pain, palpitations, orthopnea, PND, edema, syncope   Gastrointestinal:  abdominal pain , N/V, diarrhea, dysphagia, constipation, bleeding   Genitourinary:  frequency, urgency, dysuria, hematuria, incontinence   Muskuloskeletal :  arthralgia, myalgia, back pain  Hematology:  easy bruising, nose or gum bleeding, lymphadenopathy   Dermatological: rash, ulceration, pruritis, color change / jaundice  Endocrine:   hot flashes or polydipsia   Neurological:  headache, dizziness, confusion, focal weakness, paresthesia,     Speech difficulties, memory loss, gait difficulty  Psychological: Feelings of anxiety, depression, agitation    Objective:   VITALS:    Visit Vitals  BP 97/65   Pulse 85   Temp 97.6 °F (36.4 °C)   Resp 17   Ht 5' 6\" (1.676 m)   Wt 120.7 kg (266 lb)   SpO2 91%   BMI 42.93 kg/m²       PHYSICAL EXAM:    General:    Alert, cooperative, no distress, appears stated age. HEENT: Atraumatic, anicteric sclerae, pink conjunctivae     No oral ulcers, mucosa moist, throat clear, dentition fair  Neck:  Supple, symmetrical,  thyroid: non tender  Lungs:   Clear to auscultation bilaterally. No Wheezing or Rhonchi. No rales. Chest wall:  No tenderness  No Accessory muscle use. Heart:   Regular  rhythm,  No  murmur   No edema  Abdomen:   Soft, + generalized tender, no guardian. +  distended.   Bowel sounds normal  Extremities: No cyanosis. No clubbing,      Skin turgor normal, Capillary refill normal, Radial dial pulse 2+  Skin:     Not pale. Not Jaundiced  No rashes   Psych:  Good insight. Not depressed. Not anxious or agitated. Neurologic: EOMs intact. No facial asymmetry. No aphasia or slurred speech. Symmetrical strength, Sensation grossly intact. Alert and oriented X 4.     _______________________________________________________________________  Care Plan discussed with:    Comments   Patient y    Family  y Son bedside    RN y    Care Manager                    Consultant:  yakelin ED provider    _______________________________________________________________________  Expected  Disposition:   Home with Family y   HH/PT/OT/RN    SNF/LTC    NICA    ________________________________________________________________________  TOTAL TIME:  72 Minutes    Critical Care Provided     Minutes non procedure based      Comments    y Reviewed previous records   >50% of visit spent in counseling and coordination of care  Discussion with patient and/or family and questions answered       ________________________________________________________________________  Signed: Olga Pena MD    Procedures: see electronic medical records for all procedures/Xrays and details which were not copied into this note but were reviewed prior to creation of Plan.     LAB DATA REVIEWED:    Recent Results (from the past 24 hour(s))   CBC WITH AUTOMATED DIFF    Collection Time: 10/08/21  5:58 PM   Result Value Ref Range    WBC 8.2 4.1 - 11.1 K/uL    RBC 2.47 (L) 4.10 - 5.70 M/uL    HGB 7.2 (L) 12.1 - 17.0 g/dL    HCT 21.2 (L) 36.6 - 50.3 %    MCV 85.8 80.0 - 99.0 FL    MCH 29.1 26.0 - 34.0 PG    MCHC 34.0 30.0 - 36.5 g/dL    RDW 14.5 11.5 - 14.5 %    PLATELET 566 (L) 887 - 400 K/uL    MPV 11.9 8.9 - 12.9 FL    NRBC 0.2 (H) 0  WBC    ABSOLUTE NRBC 0.02 (H) 0.00 - 0.01 K/uL    NEUTROPHILS 82 (H) 32 - 75 %    LYMPHOCYTES 11 (L) 12 - 49 % MONOCYTES 6 5 - 13 %    EOSINOPHILS 0 0 - 7 %    BASOPHILS 0 0 - 1 %    IMMATURE GRANULOCYTES 1 (H) 0.0 - 0.5 %    ABS. NEUTROPHILS 6.7 1.8 - 8.0 K/UL    ABS. LYMPHOCYTES 0.9 0.8 - 3.5 K/UL    ABS. MONOCYTES 0.5 0.0 - 1.0 K/UL    ABS. EOSINOPHILS 0.0 0.0 - 0.4 K/UL    ABS. BASOPHILS 0.0 0.0 - 0.1 K/UL    ABS. IMM. GRANS. 0.1 (H) 0.00 - 0.04 K/UL    DF AUTOMATED     METABOLIC PANEL, COMPREHENSIVE    Collection Time: 10/08/21  5:58 PM   Result Value Ref Range    Sodium 136 136 - 145 mmol/L    Potassium 2.8 (L) 3.5 - 5.1 mmol/L    Chloride 97 97 - 108 mmol/L    CO2 29 21 - 32 mmol/L    Anion gap 10 5 - 15 mmol/L    Glucose 232 (H) 65 - 100 mg/dL    BUN 56 (H) 6 - 20 MG/DL    Creatinine 1.36 (H) 0.70 - 1.30 MG/DL    BUN/Creatinine ratio 41 (H) 12 - 20      GFR est AA >60 >60 ml/min/1.73m2    GFR est non-AA 53 (L) >60 ml/min/1.73m2    Calcium 9.0 8.5 - 10.1 MG/DL    Bilirubin, total 0.3 0.2 - 1.0 MG/DL    ALT (SGPT) 32 12 - 78 U/L    AST (SGOT) 23 15 - 37 U/L    Alk. phosphatase 51 45 - 117 U/L    Protein, total 6.3 (L) 6.4 - 8.2 g/dL    Albumin 2.8 (L) 3.5 - 5.0 g/dL    Globulin 3.5 2.0 - 4.0 g/dL    A-G Ratio 0.8 (L) 1.1 - 2.2     LIPASE    Collection Time: 10/08/21  5:58 PM   Result Value Ref Range    Lipase 129 73 - 393 U/L   TROPONIN-HIGH SENSITIVITY    Collection Time: 10/08/21  5:58 PM   Result Value Ref Range    Troponin-High Sensitivity 8 0 - 76 ng/L   RBC, ALLOCATE    Collection Time: 10/08/21  6:00 PM   Result Value Ref Range    HISTORY CHECKED?  Historical check performed    TYPE & SCREEN    Collection Time: 10/08/21  6:08 PM   Result Value Ref Range    Crossmatch Expiration 10/11/2021,2359     ABO/Rh(D) A POSITIVE     Antibody screen NEG     Unit number [id number]     Blood component type  LR     Unit division 00     Status of unit ALLOCATED     Crossmatch result Compatible    EKG, 12 LEAD, INITIAL    Collection Time: 10/08/21  7:45 PM   Result Value Ref Range    Ventricular Rate 79 BPM    Atrial Rate 79 BPM    P-R Interval 152 ms    QRS Duration 150 ms    Q-T Interval 502 ms    QTC Calculation (Bezet) 575 ms    Calculated P Axis 40 degrees    Calculated R Axis -4 degrees    Calculated T Axis 20 degrees    Diagnosis       Normal sinus rhythm  Right bundle branch block  No previous ECGs available     BLOOD GAS,CHEM8,LACTIC ACID POC    Collection Time: 10/08/21  9:46 PM   Result Value Ref Range    Calcium, ionized (POC) 1.15 1.12 - 1.32 mmol/L    BICARBONATE 26 mmol/L    Base excess (POC) 1.2 mmol/L    Sample source VENOUS BLOOD      CO2, POC 27 (H) 19 - 24 MMOL/L    Sodium,  136 - 145 MMOL/L    Potassium, POC 2.8 (LL) 3.5 - 5.5 MMOL/L    Chloride, POC 97 (L) 100 - 108 MMOL/L    Glucose,  (H) 74 - 106 MG/DL    Creatinine, POC 0.7 0.6 - 1.3 MG/DL    Lactic Acid (POC) 7.91 (HH) 0.40 - 2.00 mmol/L    Critical value read back Radha Madrid MD     pH, venous (POC) 7.40 7.32 - 7.42      pCO2, venous (POC) 43.0 41 - 51 MMHG    pO2, venous (POC) 32 25 - 40 mmHg

## 2021-10-09 NOTE — CONSULTS
GI Consultation Note Ruby Marroquin)    NAME: Terrance Menjivar Del-Benitez : 1960 MRN: [id number]   ATTG: Dr. Fabrice Reed PCP: None  Date/Time:  10/9/2021 8:13 AM  Subjective:   REASON FOR CONSULT:        Keira Mahmodo is a 59 y.o. male who I was asked to see for anemia, cirrhosis, dark red emesis. Reports he felt unwell, vomited dark black stuff, and was confused a little bit, mumbling so brought to the ER. He also reports a dark stool this AM.  We don't have labs back yet, or drawn, from this AM as he just completed pRBCs x 1    PMH notable for liver cirrhosis, 2/2 WALTERS. See PMH outlined in admission H&P, which I reviewed. He follows with a GI in [place], but last EGD about 3 months ago per pt, years ago per notes though. No past medical history on file. No past surgical history on file. Social History     Tobacco Use    Smoking status: Not on file   Substance Use Topics    Alcohol use: Not on file      No family history on file. No Known Allergies   Home Medications:  Prior to Admission Medications   Prescriptions Last Dose Informant Patient Reported? Taking?   metFORMIN (GLUCOPHAGE) 1,000 mg tablet 10/9/2021 at Unknown time  Yes Yes   Sig: Take 1,000 mg by mouth two (2) times daily (with meals).       Facility-Administered Medications: None     Hospital medications:  Current Facility-Administered Medications   Medication Dose Route Frequency    0.9% sodium chloride infusion 250 mL  250 mL IntraVENous PRN    0.9% sodium chloride infusion 250 mL  250 mL IntraVENous PRN    octreotide (SANDOSTATIN) 500 mcg in 0.9% sodium chloride 500 mL infusion  50 mcg/hr IntraVENous CONTINUOUS    pantoprazole (PROTONIX) 40 mg in 0.9% sodium chloride 10 mL injection  40 mg IntraVENous Q12H    insulin lispro (HUMALOG) injection   SubCUTAneous Q6H    glucose chewable tablet 16 g  4 Tablet Oral PRN    dextrose (D50W) injection syrg 12.5-25 g  12.5-25 g IntraVENous PRN    glucagon (GLUCAGEN) injection 1 mg  1 mg IntraMUSCular PRN    acetaminophen (TYLENOL) tablet 325 mg  325 mg Oral Q8H PRN    ondansetron (ZOFRAN) injection 4 mg  4 mg IntraVENous Q4H PRN    cefTRIAXone (ROCEPHIN) 1 g in 0.9% sodium chloride (MBP/ADV) 50 mL MBP  1 g IntraVENous Q24H    0.9% sodium chloride infusion  50 mL/hr IntraVENous CONTINUOUS     REVIEW OF SYSTEMS:     []     Unable to obtain  ROS due to  []    mental status change  []    sedated   []    intubated   [x]    Total of 11 systems reviewed as follows:  Const:  negative fever, negative chills, negative weight loss  Eyes:   negative diplopia or visual changes, negative eye pain  ENT:   negative coryza, negative sore throat  Resp:   negative cough, hemoptysis, dyspnea  Cards:  negative for chest pain, palpitations, lower extremity edema  :  negative for frequency, dysuria and hematuria  Skin:   negative for rash and pruritus  Heme:  negative for easy bruising and gum/nose bleeding  MS:  negative for myalgias, arthralgias, back pain and muscle weakness  Neurolo:  negative for headaches, dizziness, vertigo, memory problems   Psych:  negative for feelings of anxiety, depression     Pertinent Positives include :    Objective:   VITALS:    Visit Vitals  BP (!) 88/40 (BP 1 Location: Left upper arm, BP Patient Position: At rest;Lying right side)   Pulse 74   Temp 98.3 °F (36.8 °C)   Resp 16   Ht 5' 6\" (1.676 m)   Wt 120.7 kg (266 lb)   SpO2 96%   BMI 42.93 kg/m²     Temp (24hrs), Av.4 °F (36.9 °C), Min:97.6 °F (36.4 °C), Max:100.2 °F (37.9 °C)    PHYSICAL EXAM:   General:    Alert, cooperative, no distress, appears stated age. Head:   Normocephalic, without obvious abnormality, atraumatic. Eyes:   Conjunctivae clear, anicteric sclerae. Pupils are equal  Nose:  Nares normal. No drainage or sinus tenderness. Throat:    Lips, mucosa, and tongue normal.  No Thrush  Neck:  Supple, symmetrical,  no adenopathy, thyroid: non tender  Back:    Symmetric,  No CVA tenderness.   Lungs:   CTA bilaterally. No wheezing/rhonchi/rales. Chest wall:  No tenderness or deformity. No Accessory muscle use. Heart:   Regular rate and rhythm,  no murmur, rub or gallop. Abdomen:   Soft, non-tender. Not distended. Bowel sounds normal. No masses  Extremities: Atraumatic, No cyanosis. No edema. No clubbing  Skin:     Texture, turgor normal. No rashes/lesions/jaundice  Lymph: Cervical, supraclavicular normal.  Psych:  Good insight. Not depressed. Not anxious or agitated. Neurologic: EOMs intact. No facial asymmetry. No aphasia or slurred speech. Normal strength, A/O X 3. LAB DATA REVIEWED:    Recent Results (from the past 48 hour(s))   CBC WITH AUTOMATED DIFF    Collection Time: 10/08/21  5:58 PM   Result Value Ref Range    WBC 8.2 4.1 - 11.1 K/uL    RBC 2.47 (L) 4.10 - 5.70 M/uL    HGB 7.2 (L) 12.1 - 17.0 g/dL    HCT 21.2 (L) 36.6 - 50.3 %    MCV 85.8 80.0 - 99.0 FL    MCH 29.1 26.0 - 34.0 PG    MCHC 34.0 30.0 - 36.5 g/dL    RDW 14.5 11.5 - 14.5 %    PLATELET 984 (L) 555 - 400 K/uL    MPV 11.9 8.9 - 12.9 FL    NRBC 0.2 (H) 0  WBC    ABSOLUTE NRBC 0.02 (H) 0.00 - 0.01 K/uL    NEUTROPHILS 82 (H) 32 - 75 %    LYMPHOCYTES 11 (L) 12 - 49 %    MONOCYTES 6 5 - 13 %    EOSINOPHILS 0 0 - 7 %    BASOPHILS 0 0 - 1 %    IMMATURE GRANULOCYTES 1 (H) 0.0 - 0.5 %    ABS. NEUTROPHILS 6.7 1.8 - 8.0 K/UL    ABS. LYMPHOCYTES 0.9 0.8 - 3.5 K/UL    ABS. MONOCYTES 0.5 0.0 - 1.0 K/UL    ABS. EOSINOPHILS 0.0 0.0 - 0.4 K/UL    ABS. BASOPHILS 0.0 0.0 - 0.1 K/UL    ABS. IMM.  GRANS. 0.1 (H) 0.00 - 0.04 K/UL    DF AUTOMATED     METABOLIC PANEL, COMPREHENSIVE    Collection Time: 10/08/21  5:58 PM   Result Value Ref Range    Sodium 136 136 - 145 mmol/L    Potassium 2.8 (L) 3.5 - 5.1 mmol/L    Chloride 97 97 - 108 mmol/L    CO2 29 21 - 32 mmol/L    Anion gap 10 5 - 15 mmol/L    Glucose 232 (H) 65 - 100 mg/dL    BUN 56 (H) 6 - 20 MG/DL    Creatinine 1.36 (H) 0.70 - 1.30 MG/DL    BUN/Creatinine ratio 41 (H) 12 - 20      GFR est AA >60 >60 ml/min/1.73m2    GFR est non-AA 53 (L) >60 ml/min/1.73m2    Calcium 9.0 8.5 - 10.1 MG/DL    Bilirubin, total 0.3 0.2 - 1.0 MG/DL    ALT (SGPT) 32 12 - 78 U/L    AST (SGOT) 23 15 - 37 U/L    Alk. phosphatase 51 45 - 117 U/L    Protein, total 6.3 (L) 6.4 - 8.2 g/dL    Albumin 2.8 (L) 3.5 - 5.0 g/dL    Globulin 3.5 2.0 - 4.0 g/dL    A-G Ratio 0.8 (L) 1.1 - 2.2     LIPASE    Collection Time: 10/08/21  5:58 PM   Result Value Ref Range    Lipase 129 73 - 393 U/L   TROPONIN-HIGH SENSITIVITY    Collection Time: 10/08/21  5:58 PM   Result Value Ref Range    Troponin-High Sensitivity 8 0 - 76 ng/L   RBC, ALLOCATE    Collection Time: 10/08/21  6:00 PM   Result Value Ref Range    HISTORY CHECKED?  Historical check performed    TYPE & SCREEN    Collection Time: 10/08/21  6:08 PM   Result Value Ref Range    Crossmatch Expiration 10/11/2021,2359     ABO/Rh(D) A POSITIVE     Antibody screen NEG     Unit number [id number]     Blood component type  LR     Unit division 00     Status of unit ISSUED     Crossmatch result Compatible    EKG, 12 LEAD, INITIAL    Collection Time: 10/08/21  7:45 PM   Result Value Ref Range    Ventricular Rate 79 BPM    Atrial Rate 79 BPM    P-R Interval 152 ms    QRS Duration 150 ms    Q-T Interval 502 ms    QTC Calculation (Bezet) 575 ms    Calculated P Axis 40 degrees    Calculated R Axis -4 degrees    Calculated T Axis 20 degrees    Diagnosis       Normal sinus rhythm  Right bundle branch block  No previous ECGs available     BLOOD GAS,CHEM8,LACTIC ACID POC    Collection Time: 10/08/21  9:46 PM   Result Value Ref Range    Calcium, ionized (POC) 1.15 1.12 - 1.32 mmol/L    BICARBONATE 26 mmol/L    Base excess (POC) 1.2 mmol/L    Sample source VENOUS BLOOD      CO2, POC 27 (H) 19 - 24 MMOL/L    Sodium,  136 - 145 MMOL/L    Potassium, POC 2.8 (LL) 3.5 - 5.5 MMOL/L    Chloride, POC 97 (L) 100 - 108 MMOL/L    Glucose,  (H) 74 - 106 MG/DL    Creatinine, POC 0.7 0.6 - 1.3 MG/DL    Lactic Acid (POC) 7.91 (HH) 0.40 - 2.00 mmol/L    Critical value read back Kena Obregon MD     pH, venous (POC) 7.40 7.32 - 7.42      pCO2, venous (POC) 43.0 41 - 51 MMHG    pO2, venous (POC) 32 25 - 40 mmHg   PROTHROMBIN TIME + INR    Collection Time: 10/08/21  9:51 PM   Result Value Ref Range    INR 1.1 0.9 - 1.1      Prothrombin time 11.6 (H) 9.0 - 11.1 sec   PTT    Collection Time: 10/08/21  9:51 PM   Result Value Ref Range    aPTT <20.0 (L) 22.1 - 31.0 sec    aPTT, therapeutic range     58.0 - 77.0 SECS   HGB & HCT    Collection Time: 10/08/21 11:33 PM   Result Value Ref Range    HGB 6.2 (L) 12.1 - 17.0 g/dL    HCT 17.7 (LL) 36.6 - 50.3 %   GLUCOSE, POC    Collection Time: 10/09/21  2:13 AM   Result Value Ref Range    Glucose (POC) 213 (H) 65 - 117 mg/dL    Performed by Malinda Prajapati RN    OCCULT BLOOD, STOOL    Collection Time: 10/09/21  4:30 AM   Result Value Ref Range    Occult blood, stool Positive (A) NEG     GLUCOSE, POC    Collection Time: 10/09/21  7:02 AM   Result Value Ref Range    Glucose (POC) 216 (H) 65 - 117 mg/dL    Performed by Chapito Ruggiero LPN      IMAGING RESULTS:  CT A/P:  \"IMPRESSION  Large gastric varices along the gastric fundus. No evidence of active GI bleed. Cirrhosis. \"    Assessment/Plan:      Active Problems:    GI bleeding (10/8/2021)    58 yo M with cirrhosis, anemia, dark emesis and hypotension last night. Hgb down to 6.2 this AM. No prior esophageal varices, but Gastric varices on imaging overnight. INR is 1.1, plt 113. Normal bili but no AM labs back right now. Reviewed risks, benefits, and alternatives to EGD with patient and his family. He's agreeable to proceed.    ___________________________________________________  RECOMMENDATIONS:      -- continue NPO  -- PPI  -- continue octreotide  -- continue ceftriaxone  -- will need an EGD, anticipate later on today but for anesthesia (which he'll need), we need hgb> 7 and improved K+ and we don't have these lab values  -- await labs, nurse said she would draw them shortly, to add on for EGD.  If not possible to get arranged we can do tomorrow or Monday    Discussed Code Status:    [x]    Full Code      []    DNR    ___________________________________________________  Care Plan discussed with:    [x]    Patient   [x]    Family   [x]    Nursing   []    Attending  Total Time :  55   minutes   ___________________________________________________  GI: Celina Maguire MD

## 2021-10-09 NOTE — ED NOTES
Call placed to unit for report, nurse not available; report given to Nurse Jerrod Walters since nurse is still with a patient.

## 2021-10-09 NOTE — ED NOTES
Rachel Hernandez [facility] [phone] cell son contact for emergencies

## 2021-10-09 NOTE — PROGRESS NOTES
Hospitalist Progress Note    NAME: Galileo Estrada Del-Benitez   :  1960   MRN:  [id number]       Assessment / Plan:    UGI bleeding in settings of Cirrhosis of liver due to WALTERS with ascites  We will transfer to stepdown unit due to high risk of deterioration  1 unit of PRBC was moderate but due to blood product shortage was not released by blood bank unless hemoglobin less than 7 or patient is actively bleeding  Hemoglobin less than 7 this morning, nurse notified to transfuse blood  Monitor H&H closely, keep hemoglobin above 7  PPI IV twice daily  Octreotide drip  GI following, plans for EGD today  Keep n.p.o. Stat labs were ordered  CTX x 7 days for prophylaxis   CT: Large gastric varices along the gastric fundus. No evidence of active GI bleed. Cirrhosis.     [place]  Baseline creatinine 0.9  Gentle hydration  Monitor closely  Adjust medications for GFR  Labs pending     DM type II   hold home p.o. medications for n.p.o. Cover with sliding scale as needed  Closely monitor for hypoglycemia     Hypertension  BP low side  Hold home medications     Hyperlipidemia  Continue statin on discharge     Thrombocytopenia  Mild, likely due to cirrhosis  Monitor closely       40 or above Morbid obesity / Body mass index is 42.93 kg/m². Estimated discharge date:   Barriers: EGD, clinical improvement    Code status: Full  Prophylaxis: SCD's  Recommended Disposition: Home w/Family     Subjective:     Patient was seen and examined. No acute events overnight. Discussed with RN overnight events. All patient's questions were answered.     \"Feeling fine\"    Review of Systems:  Symptom Y/N Comments  Symptom Y/N Comments   Fever/Chills n   Chest Pain n    Poor Appetite    Edema     Cough n   Abdominal Pain n    Sputum    Joint Pain     SOB/BERMAN n   Pruritis/Rash     Nausea/vomit n   Tolerating PT/OT     Diarrhea    Tolerating Diet  npo   Constipation    Other       Could NOT obtain due to:          Objective: VITALS:   Last 24hrs VS reviewed since prior progress note. Most recent are:  Patient Vitals for the past 24 hrs:   Temp Pulse Resp BP SpO2   10/09/21 1137 97.6 °F (36.4 °C) 71 16 (!) 81/52 93 %   10/09/21 0801 98.3 °F (36.8 °C) 74 16 (!) 88/40 96 %   10/09/21 0616 98.5 °F (36.9 °C) 75 14 (!) 90/37 95 %   10/09/21 0437 98.2 °F (36.8 °C) 75 16 (!) 103/49 95 %   10/09/21 0325  78 14 (!) 92/57 94 %   10/09/21 0300 97.9 °F (36.6 °C) 79 14 (!) 92/57 95 %   10/09/21 0245 97.9 °F (36.6 °C) 79 14 (!) 93/56 99 %   10/09/21 0230 100.2 °F (37.9 °C) 76 16 (!) 87/32    10/09/21 0213 98.9 °F (37.2 °C) 83 16 (!) 109/58    10/09/21 0030  93 15 105/64    10/09/21 0015  94 15 97/62    10/09/21 0000  93 14 109/67    10/08/21 2345  92 14 104/66 92 %   10/08/21 2330  93 15 108/68    10/08/21 2329  92 18  94 %   10/08/21 2315  88 14 (!) 100/57 92 %   10/08/21 2300  88 14 108/61 95 %   10/08/21 2245  92 17 108/67 97 %   10/08/21 2230  83 14 (!) 105/55 94 %   10/08/21 2215  82 18 (!) 93/59 96 %   10/08/21 2200  81 16 99/63 93 %   10/08/21 2145  85 17 97/65 91 %   10/08/21 2137  82 15  95 %   10/08/21 2134  82 13 96/62 93 %   10/08/21 2031  82 15  91 %   10/08/21 2030  82 14 104/63    10/08/21 1900  74 16 100/64 93 %   10/08/21 1845  74 13 (!) 82/72    10/08/21 1830  76 15 (!) 123/55 95 %   10/08/21 1757 97.6 °F (36.4 °C)    100 %   10/08/21 1738 97.8 °F (36.6 °C) 70 18 (!) 79/48 100 %       Intake/Output Summary (Last 24 hours) at 10/9/2021 1142  Last data filed at 10/9/2021 2012  Gross per 24 hour   Intake 2452.3 ml   Output 1500 ml   Net 952.3 ml        I had a face to face encounter and independently examined this patient on 10/9/2021, as outlined below:  PHYSICAL EXAM:  General: WD, WN. Alert, cooperative, no acute distress    EENT:  EOMI. Anicteric sclerae. MMM  Resp:  CTA bilaterally, no wheezing or rales.   No accessory muscle use  CV:  Regular  rhythm,  No edema  GI:  Soft, Non distended, Non tender. +Bowel sounds  Neurologic:  Alert and oriented X 3, normal speech,   Psych:   Good insight. Not anxious nor agitated  Skin:  No rashes. No jaundice    Reviewed most current lab test results and cultures  YES  Reviewed most current radiology test results   YES  Review and summation of old records today    NO  Reviewed patient's current orders and MAR    YES  PMH/SH reviewed - no change compared to H&P  ________________________________________________________________________  Care Plan discussed with:    Comments   Patient x    Family      RN x    Care Manager     Consultant                        Multidiciplinary team rounds were held today with , nursing, pharmacist and clinical coordinator. Patient's plan of care was discussed; medications were reviewed and discharge planning was addressed. ________________________________________________________________________  Total NON critical care TIME:  35  Minutes    Total CRITICAL CARE TIME Spent:   Minutes non procedure based      Comments   >50% of visit spent in counseling and coordination of care     ________________________________________________________________________  Gay Hoover MD     Procedures: see electronic medical records for all procedures/Xrays and details which were not copied into this note but were reviewed prior to creation of Plan. LABS:  I reviewed today's most current labs and imaging studies.   Pertinent labs include:  Recent Labs     10/08/21  2333 10/08/21  1758   WBC  --  8.2   HGB 6.2* 7.2*   HCT 17.7* 21.2*   PLT  --  113*     Recent Labs     10/08/21  2151 10/08/21  1758   NA  --  136   K  --  2.8*   CL  --  97   CO2  --  29   GLU  --  232*   BUN  --  56*   CREA  --  1.36*   CA  --  9.0   ALB  --  2.8*   TBILI  --  0.3   ALT  --  32   INR 1.1  --        Signed: Gay Hoover MD

## 2021-10-10 LAB
ABO + RH BLD: NORMAL
BLD PROD TYP BPU: NORMAL
BLOOD GROUP ANTIBODIES SERPL: NORMAL
BPU ID: NORMAL
CROSSMATCH RESULT,%XM: NORMAL
GLUCOSE BLD STRIP.AUTO-MCNC: 184 MG/DL (ref 65–117)
GLUCOSE BLD STRIP.AUTO-MCNC: 190 MG/DL (ref 65–117)
GLUCOSE BLD STRIP.AUTO-MCNC: 194 MG/DL (ref 65–117)
GLUCOSE BLD STRIP.AUTO-MCNC: 195 MG/DL (ref 65–117)
GLUCOSE BLD STRIP.AUTO-MCNC: 226 MG/DL (ref 65–117)
GLUCOSE BLD STRIP.AUTO-MCNC: 233 MG/DL (ref 65–117)
HCT VFR BLD AUTO: 21 % (ref 36.6–50.3)
HCT VFR BLD AUTO: 21 % (ref 36.6–50.3)
HCT VFR BLD AUTO: 21.3 % (ref 36.6–50.3)
HCT VFR BLD AUTO: 22.3 % (ref 36.6–50.3)
HGB BLD-MCNC: 7.1 G/DL (ref 12.1–17)
HGB BLD-MCNC: 7.1 G/DL (ref 12.1–17)
HGB BLD-MCNC: 7.2 G/DL (ref 12.1–17)
HGB BLD-MCNC: 7.3 G/DL (ref 12.1–17)
SERVICE CMNT-IMP: ABNORMAL
SPECIMEN EXP DATE BLD: NORMAL
STATUS OF UNIT,%ST: NORMAL
UNIT DIVISION, %UDIV: 0

## 2021-10-10 PROCEDURE — 74011250636 HC RX REV CODE- 250/636: Performed by: NURSE PRACTITIONER

## 2021-10-10 PROCEDURE — 74011250636 HC RX REV CODE- 250/636: Performed by: EMERGENCY MEDICINE

## 2021-10-10 PROCEDURE — 77030040361 HC SLV COMPR DVT MDII -B: Performed by: INTERNAL MEDICINE

## 2021-10-10 PROCEDURE — 76210000000 HC OR PH I REC 2 TO 2.5 HR: Performed by: INTERNAL MEDICINE

## 2021-10-10 PROCEDURE — 0DJ08ZZ INSPECTION OF UPPER INTESTINAL TRACT, VIA NATURAL OR ARTIFICIAL OPENING ENDOSCOPIC: ICD-10-PCS | Performed by: INTERNAL MEDICINE

## 2021-10-10 PROCEDURE — 74011636637 HC RX REV CODE- 636/637: Performed by: EMERGENCY MEDICINE

## 2021-10-10 PROCEDURE — 85014 HEMATOCRIT: CPT

## 2021-10-10 PROCEDURE — 82962 GLUCOSE BLOOD TEST: CPT

## 2021-10-10 PROCEDURE — 65660000000 HC RM CCU STEPDOWN

## 2021-10-10 PROCEDURE — 74011250636 HC RX REV CODE- 250/636: Performed by: INTERNAL MEDICINE

## 2021-10-10 PROCEDURE — 74011250636 HC RX REV CODE- 250/636: Performed by: ANESTHESIOLOGY

## 2021-10-10 PROCEDURE — 74011000250 HC RX REV CODE- 250: Performed by: HOSPITALIST

## 2021-10-10 PROCEDURE — 74011000258 HC RX REV CODE- 258: Performed by: INTERNAL MEDICINE

## 2021-10-10 PROCEDURE — C9113 INJ PANTOPRAZOLE SODIUM, VIA: HCPCS | Performed by: HOSPITALIST

## 2021-10-10 PROCEDURE — 2709999900 HC NON-CHARGEABLE SUPPLY: Performed by: INTERNAL MEDICINE

## 2021-10-10 PROCEDURE — P9045 ALBUMIN (HUMAN), 5%, 250 ML: HCPCS | Performed by: ANESTHESIOLOGY

## 2021-10-10 PROCEDURE — 74011636637 HC RX REV CODE- 636/637: Performed by: HOSPITALIST

## 2021-10-10 PROCEDURE — 74011250636 HC RX REV CODE- 250/636: Performed by: NURSE ANESTHETIST, CERTIFIED REGISTERED

## 2021-10-10 PROCEDURE — 77010033678 HC OXYGEN DAILY

## 2021-10-10 PROCEDURE — 76060000031 HC ANESTHESIA FIRST 0.5 HR: Performed by: INTERNAL MEDICINE

## 2021-10-10 PROCEDURE — 74011250636 HC RX REV CODE- 250/636: Performed by: HOSPITALIST

## 2021-10-10 PROCEDURE — 36415 COLL VENOUS BLD VENIPUNCTURE: CPT

## 2021-10-10 PROCEDURE — 76010000154 HC OR TIME FIRST 0.5 HR: Performed by: INTERNAL MEDICINE

## 2021-10-10 RX ORDER — ATROPINE SULFATE 0.1 MG/ML
0.5 INJECTION INTRAVENOUS
Status: ACTIVE | OUTPATIENT
Start: 2021-10-10 | End: 2021-10-11

## 2021-10-10 RX ORDER — SODIUM CHLORIDE 0.9 % (FLUSH) 0.9 %
5-40 SYRINGE (ML) INJECTION EVERY 8 HOURS
Status: DISCONTINUED | OUTPATIENT
Start: 2021-10-10 | End: 2021-10-10 | Stop reason: HOSPADM

## 2021-10-10 RX ORDER — DIPHENHYDRAMINE HYDROCHLORIDE 50 MG/ML
12.5 INJECTION, SOLUTION INTRAMUSCULAR; INTRAVENOUS AS NEEDED
Status: DISCONTINUED | OUTPATIENT
Start: 2021-10-10 | End: 2021-10-10 | Stop reason: HOSPADM

## 2021-10-10 RX ORDER — SODIUM CHLORIDE, SODIUM LACTATE, POTASSIUM CHLORIDE, CALCIUM CHLORIDE 600; 310; 30; 20 MG/100ML; MG/100ML; MG/100ML; MG/100ML
INJECTION, SOLUTION INTRAVENOUS
Status: DISCONTINUED | OUTPATIENT
Start: 2021-10-10 | End: 2021-10-10 | Stop reason: HOSPADM

## 2021-10-10 RX ORDER — ALBUMIN HUMAN 50 G/1000ML
SOLUTION INTRAVENOUS
Status: DISPENSED
Start: 2021-10-10 | End: 2021-10-11

## 2021-10-10 RX ORDER — SODIUM CHLORIDE 0.9 % (FLUSH) 0.9 %
5-40 SYRINGE (ML) INJECTION AS NEEDED
Status: DISCONTINUED | OUTPATIENT
Start: 2021-10-10 | End: 2021-10-10 | Stop reason: HOSPADM

## 2021-10-10 RX ORDER — HYDROMORPHONE HYDROCHLORIDE 1 MG/ML
0.2 INJECTION, SOLUTION INTRAMUSCULAR; INTRAVENOUS; SUBCUTANEOUS
Status: DISCONTINUED | OUTPATIENT
Start: 2021-10-10 | End: 2021-10-10 | Stop reason: HOSPADM

## 2021-10-10 RX ORDER — PROPOFOL 10 MG/ML
INJECTION, EMULSION INTRAVENOUS AS NEEDED
Status: DISCONTINUED | OUTPATIENT
Start: 2021-10-10 | End: 2021-10-10 | Stop reason: HOSPADM

## 2021-10-10 RX ORDER — FENTANYL CITRATE 50 UG/ML
25 INJECTION, SOLUTION INTRAMUSCULAR; INTRAVENOUS
Status: DISCONTINUED | OUTPATIENT
Start: 2021-10-10 | End: 2021-10-10 | Stop reason: HOSPADM

## 2021-10-10 RX ORDER — FLUMAZENIL 0.1 MG/ML
0.2 INJECTION INTRAVENOUS
Status: ACTIVE | OUTPATIENT
Start: 2021-10-10 | End: 2021-10-10

## 2021-10-10 RX ORDER — DEXAMETHASONE SODIUM PHOSPHATE 4 MG/ML
INJECTION, SOLUTION INTRA-ARTICULAR; INTRALESIONAL; INTRAMUSCULAR; INTRAVENOUS; SOFT TISSUE AS NEEDED
Status: DISCONTINUED | OUTPATIENT
Start: 2021-10-10 | End: 2021-10-10 | Stop reason: HOSPADM

## 2021-10-10 RX ORDER — NALOXONE HYDROCHLORIDE 0.4 MG/ML
0.4 INJECTION, SOLUTION INTRAMUSCULAR; INTRAVENOUS; SUBCUTANEOUS
Status: ACTIVE | OUTPATIENT
Start: 2021-10-10 | End: 2021-10-10

## 2021-10-10 RX ORDER — EPINEPHRINE 0.1 MG/ML
1 INJECTION INTRACARDIAC; INTRAVENOUS
Status: ACTIVE | OUTPATIENT
Start: 2021-10-10 | End: 2021-10-11

## 2021-10-10 RX ORDER — ONDANSETRON 2 MG/ML
INJECTION INTRAMUSCULAR; INTRAVENOUS AS NEEDED
Status: DISCONTINUED | OUTPATIENT
Start: 2021-10-10 | End: 2021-10-10 | Stop reason: HOSPADM

## 2021-10-10 RX ORDER — ALBUMIN HUMAN 50 G/1000ML
12.5 SOLUTION INTRAVENOUS ONCE
Status: COMPLETED | OUTPATIENT
Start: 2021-10-10 | End: 2021-10-10

## 2021-10-10 RX ORDER — SODIUM CHLORIDE 9 MG/ML
25 INJECTION, SOLUTION INTRAVENOUS CONTINUOUS
Status: DISPENSED | OUTPATIENT
Start: 2021-10-10 | End: 2021-10-10

## 2021-10-10 RX ORDER — SODIUM CHLORIDE 0.9 % (FLUSH) 0.9 %
5-40 SYRINGE (ML) INJECTION AS NEEDED
Status: DISCONTINUED | OUTPATIENT
Start: 2021-10-10 | End: 2021-10-14 | Stop reason: HOSPADM

## 2021-10-10 RX ORDER — DEXTROMETHORPHAN/PSEUDOEPHED 2.5-7.5/.8
1.2 DROPS ORAL
Status: DISCONTINUED | OUTPATIENT
Start: 2021-10-10 | End: 2021-10-14 | Stop reason: HOSPADM

## 2021-10-10 RX ORDER — SODIUM CHLORIDE 0.9 % (FLUSH) 0.9 %
5-40 SYRINGE (ML) INJECTION EVERY 8 HOURS
Status: DISCONTINUED | OUTPATIENT
Start: 2021-10-10 | End: 2021-10-14 | Stop reason: HOSPADM

## 2021-10-10 RX ORDER — PROPOFOL 10 MG/ML
INJECTION, EMULSION INTRAVENOUS
Status: DISCONTINUED | OUTPATIENT
Start: 2021-10-10 | End: 2021-10-10 | Stop reason: HOSPADM

## 2021-10-10 RX ORDER — SODIUM CHLORIDE, SODIUM LACTATE, POTASSIUM CHLORIDE, CALCIUM CHLORIDE 600; 310; 30; 20 MG/100ML; MG/100ML; MG/100ML; MG/100ML
25 INJECTION, SOLUTION INTRAVENOUS CONTINUOUS
Status: DISCONTINUED | OUTPATIENT
Start: 2021-10-10 | End: 2021-10-10 | Stop reason: HOSPADM

## 2021-10-10 RX ORDER — LIDOCAINE HYDROCHLORIDE 10 MG/ML
0.1 INJECTION, SOLUTION EPIDURAL; INFILTRATION; INTRACAUDAL; PERINEURAL AS NEEDED
Status: DISCONTINUED | OUTPATIENT
Start: 2021-10-10 | End: 2021-10-10 | Stop reason: HOSPADM

## 2021-10-10 RX ADMIN — PROPOFOL 45 MCG/KG/MIN: 10 INJECTION, EMULSION INTRAVENOUS at 12:54

## 2021-10-10 RX ADMIN — SODIUM CHLORIDE 25 ML/HR: 9 INJECTION, SOLUTION INTRAVENOUS at 13:48

## 2021-10-10 RX ADMIN — INSULIN LISPRO 2 UNITS: 100 INJECTION, SOLUTION INTRAVENOUS; SUBCUTANEOUS at 18:04

## 2021-10-10 RX ADMIN — SODIUM CHLORIDE, SODIUM LACTATE, POTASSIUM CHLORIDE, AND CALCIUM CHLORIDE: 600; 310; 30; 20 INJECTION, SOLUTION INTRAVENOUS at 12:50

## 2021-10-10 RX ADMIN — ONDANSETRON HYDROCHLORIDE 4 MG: 2 INJECTION, SOLUTION INTRAMUSCULAR; INTRAVENOUS at 13:00

## 2021-10-10 RX ADMIN — DEXAMETHASONE SODIUM PHOSPHATE 10 MG: 4 INJECTION, SOLUTION INTRAMUSCULAR; INTRAVENOUS at 12:57

## 2021-10-10 RX ADMIN — SODIUM CHLORIDE 40 MG: 9 INJECTION INTRAMUSCULAR; INTRAVENOUS; SUBCUTANEOUS at 21:56

## 2021-10-10 RX ADMIN — OCTREOTIDE ACETATE 50 MCG/HR: 500 INJECTION, SOLUTION INTRAVENOUS; SUBCUTANEOUS at 17:10

## 2021-10-10 RX ADMIN — SODIUM CHLORIDE 40 MG: 9 INJECTION INTRAMUSCULAR; INTRAVENOUS; SUBCUTANEOUS at 08:02

## 2021-10-10 RX ADMIN — Medication 10 ML: at 23:51

## 2021-10-10 RX ADMIN — SODIUM CHLORIDE, POTASSIUM CHLORIDE, SODIUM LACTATE AND CALCIUM CHLORIDE 250 ML: 600; 310; 30; 20 INJECTION, SOLUTION INTRAVENOUS at 14:00

## 2021-10-10 RX ADMIN — Medication 10 ML: at 14:00

## 2021-10-10 RX ADMIN — CEFTRIAXONE 1 G: 1 INJECTION, POWDER, FOR SOLUTION INTRAMUSCULAR; INTRAVENOUS at 21:56

## 2021-10-10 RX ADMIN — PROPOFOL 30 MG: 10 INJECTION, EMULSION INTRAVENOUS at 12:54

## 2021-10-10 RX ADMIN — ALBUMIN (HUMAN) 12.5 G: 12.5 INJECTION, SOLUTION INTRAVENOUS at 14:28

## 2021-10-10 RX ADMIN — OCTREOTIDE ACETATE 50 MCG/HR: 500 INJECTION, SOLUTION INTRAVENOUS; SUBCUTANEOUS at 06:53

## 2021-10-10 RX ADMIN — INSULIN LISPRO 1 UNITS: 100 INJECTION, SOLUTION INTRAVENOUS; SUBCUTANEOUS at 23:50

## 2021-10-10 NOTE — PROGRESS NOTES
1100: noted repeat Hgb 7.1    1140: Patient BRYAN for EGD    1520: Report received from PACU post EGD. 1550: Patient back to room 2272 post EGD. 1615: Patient requesting to eat (still has NPO order) and wife would like update from Dr. Benny Alatorre. MD paged. 1700: MD paged again. 1702:Noted patients Hgb 7.3    1705: Spoke to Dr. Benny Alatorre. Verbal orders for patient to have clear liquid diet but NPO again at midnight. MD spoke to patient and wife. End of Shift Note    Bedside shift change report given to Jarod James (oncoming nurse) by Erwin Thomas RN (offgoing nurse). Report included the following information SBAR, Kardex, Intake/Output, MAR and Recent Results    Shift worked:  7a-7p     Shift summary and any significant changes:    See above. Possible BRTO? See GI note. NPO at midnight. Concerns for physician to address:      Zone phone for oncoming shift:          Activity:  Activity Level: Up with Assistance  Number times ambulated in hallways past shift: 0  Number of times OOB to chair past shift: 0    Cardiac:   Cardiac Monitoring: Yes      Cardiac Rhythm: Sinus Rhythm    Access:   Current line(s): PIV     Genitourinary:   Urinary status: voiding    Respiratory:   O2 Device: Nasal cannula  Chronic home O2 use?: NO  Incentive spirometer at bedside: NO     GI:  Last Bowel Movement Date: 10/09/21  Current diet:  ADULT DIET Clear Liquid  DIET NPO  Passing flatus: YES  Tolerating current diet: NO       Pain Management:   Patient states pain is manageable on current regimen: YES    Skin:  Christopher Score: 19  Interventions: increase time out of bed    Patient Safety:  Fall Score:  Total Score: 4  Interventions: bed/chair alarm and pt to call before getting OOB  High Fall Risk: Yes    Length of Stay:  Expected LOS: - - -  Actual LOS: 2      Erwin Thomas, RN

## 2021-10-10 NOTE — PROCEDURES
NAME:  Jonn Moralez-Benitez   :   1960   MRN:   [id number]     Date/Time:  10/10/2021 1:12 PM    Esophagogastroduodenoscopy (EGD) Procedure Note    Procedure: Esophagogastroduodenoscopy --diagnostic    Indication: melena, acute post hemorrhagic anemia  Pre-operative Diagnosis: see indication above  Post-operative Diagnosis: see findings below  :  Cesario Ahumada, MD  Referring Provider:   -None    Exam:  Airway: clear, no airway problems anticipated  Heart: RRR, without gallops or rubs  Lungs: clear bilaterally without wheezes, crackles, or rhonchi  Abdomen: soft, nontender, nondistended, bowel sounds present  Mental Status: awake, alert and oriented to person, place and time     Anethesia/Sedation:  MAC anesthesia Propofol  Procedure Details   After informed consent was obtained for the procedure, with all risks and benefits of procedure explained the patient was taken to the endoscopy suite and placed in the left lateral decubitus position. Following sequential administration of sedation as per above, the [id number] gastroscope was inserted into the mouth and advanced under direct vision to third portion of the duodenum. A careful inspection was made as the gastroscope was withdrawn, including a retroflexed view of the proximal stomach; findings and interventions are described below. Findings:   OROPHARYNX: Cords and pyriform recesses normal.   ESOPHAGUS: The esophagus is normal. The proximal, mid, and distal portions are normal. The Z-Line is intact. There are no esophageal varices  STOMACH:   -- isolated gastric varix (IGV1 or gastric fundal varix) with overlying clot along the greater curve of the stomach. No active bleeding identified  -- The body, antrum, and pylorus with some erythema and edema, maybe some early GAVE but no active bleeding or high risk stigmata. DUODENUM: The bulb, second and third portions are normal.    Therapies:   none    Specimens: none    EBL:  None. Complications:   None; patient tolerated the procedure well. Impression:  -- isolated gastric varix (IGV1 or gastric fundal varix) with overlying clot along the greater curve of the stomach. No active bleeding identified, though this is almost certainly the source of bleeding and at elevated risk of rebleeding off   -- no esophageal varices    Recommendations:  -- Consideration for BRTO (balloon-occluded retrograde transvenous obliteration) with radiology, need to see if spontaneous gastrorenal shunt present or other access with IR; This can be addressed tomorrow unless urgent bleeding tonight.   -- continue octreotide  -- continue PPI  -- continue ceftriaxone  -- maintain hgb of 6-8 g/dL but not higher to maintain lower oncotic pressure  -- if urgent bleeding, would need urgent IR consultation for consideration of embolization options vs TIPS or BRTO  -- he lives in [place], and will need to follow with GI/hepatology there, after D/C    Discharge disposition: back to wards    Pratibha Deshpande MD

## 2021-10-10 NOTE — PROGRESS NOTES
Received message from patient's nurse statin: pt's HGB 6.3 after 1 unit RBC infusion and  potassium 3.3.    pt is c/o 7/10 pain to left abdomen. he is NPO. Discussion / orders:    · Transfuse another unit of packed red blood cell  · Potassium chloride 10 mEq IV x2  · Morphine 1 mg IV x1           Please note that this note was dictated using Dragon computer voice recognition software. Quite often unanticipated grammatical, syntax, homophones, and other interpretive errors are inadvertently transcribed by the computer software. Please disregard these errors. Please excuse any errors that have escaped final proofreading.

## 2021-10-10 NOTE — H&P
Date of Surgery Update:  [facility] Alison was seen and examined. History and physical has been reviewed. The patient has been examined.  There have been no significant clinical changes since the completion of the originally dated History and Physical.    Signed By: Don Alas MD     October 10, 2021 12:18 PM

## 2021-10-10 NOTE — PROGRESS NOTES
Discussed and reviewed plan of care, EGD today with patient and his daughter in law, and they're still agreeable for EGD later, for noon is the plan. .    All questions answered. He asked me to call his son. Atilio Pineda @ [phone] afterwards, which I'll do.

## 2021-10-10 NOTE — PROGRESS NOTES
Transition of Care Plan:    RUR: 16% low risk for readmission  Disposition: Home with spouse  Follow up appointments: PCP, GI?  DME needed: None anticipated  Transportation at Discharge: Pt's spouse  Suraj August or means to access home:  Pt has access      IM Medicare Letter: N/A - commercial insurance   Is patient a BCPI-A Bundle:  N/A     If yes, was Bundle Letter given?:     Caregiver Contact: Pt's spouse, Jaylin HarrisBenitez p) [phone]  Discharge Caregiver contacted prior to discharge? 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 Jason Carr (sp?? - pt did not know spelling of doctor's name); last seen Thursday     Name of Practice:    Are you a current patient: Yes/No: Yes   Approximate date of last visit: Thursday 10/7 per pt   Can you participate in a virtual visit with your PCP:                     Current Advanced Directive/Advance Care Plan: Full Code   Jovita 13 (ACP) Conversation      Date of Conversation: 10/10/2021  Conducted with: Patient with Decision Making Capacity    Healthcare Decision Maker:     Primary Decision Maker: Jaylin Rosas - Spouse - [phone]  Click here to [address] including selection of the [place] Relationship (ie \"Primary\")      Today we documented Decision Maker(s) consistent with Legal Next of Kin hierarchy.     Content/Action Overview:   DECLINED ACP conversation - will revisit periodically   Reviewed DNR/DNI and patient elects Full Code (Attempt Resuscitation)    Length of Voluntary ACP Conversation in minutes:  <16 minutes (Non-Billable)    [place]: Primary Decision Maker: AlisonChance - Spouse - [phone] - LNOK                      Transition of Care Plan:  Home with spouse to [place], outpatient follow up appts    CM reviewed chart. CM completed assessment with pt. CM introduced self/role, verified demographics, and discussed discharge planning. Pt reports that he lives with his spouse in [place]. He is a  and started feeling poorly at work. Pt reports that he sees a PCP in [place], last seen on Thursday on 10/7. Pt reports at d/c his spouse will transport him home and they will return to [place]. Pt is independent at baseline. Pt reports no concerns with transition of care plans. If pt needs new prescriptions at d/, would like to use a pharmacy close to the hospital to  medications on his way home to [place]. Care management will continue to follow for transition of care planning needs. Care Management Interventions  PCP Verified by CM: No (Pt reports Dr in [place], Dr. Maldonado Lamar (sp?) last seen on Thursday)  Mode of Transport at Discharge:  Other (see comment) (Pt's spouse)  Transition of Care Consult (CM Consult): Discharge Planning  Discharge Durable Medical Equipment: No  Physical Therapy Consult: No  Occupational Therapy Consult: No  Speech Therapy Consult: No  Support Systems: Spouse/Significant Other  Confirm Follow Up Transport: Self  Discharge Location  Discharge Placement: Home (Home with spouse)    Alyce Borjas [address], [address]

## 2021-10-10 NOTE — PROGRESS NOTES
Hospitalist Progress Note    NAME: Amos Henry Del-Benitez   :  1960   MRN:  [id number]       Assessment / Plan:    UGI bleeding in settings of Cirrhosis of liver due to WALTERS with ascites  Continue care at stepdown unit due to high risk for deterioration  1 unit of PRBC was moderate but due to blood product shortage was not released by blood bank unless hemoglobin less than 7 or patient is actively bleeding  Transfused packed red blood cells on   Hemoglobin more than 7 today  Monitor H&H closely, keep hemoglobin above 7  PPI IV twice daily  Octreotide drip  GI following, plans for EGD today  Keep n.p.o. Stat labs were ordered  CTX x 7 days for prophylaxis   CT: Large gastric varices along the gastric fundus. No evidence of active GI bleed. Cirrhosis.     [place]  Hypokalemia  Baseline creatinine 0.9  Gentle hydration  Monitor closely  Adjust medications for GFR  Labs are pending     DM type II   hold home p.o. medications for n.p.o. Cover with sliding scale as needed  Closely monitor for hypoglycemia     Hypertension  BP low side  Hold home medications     Hyperlipidemia  Continue statin on discharge     Thrombocytopenia  Mild, likely due to cirrhosis  Monitor closely       40 or above Morbid obesity / Body mass index is 42.93 kg/m². Estimated discharge date:   Barriers: EGD, clinical improvement    Code status: Full  Prophylaxis: SCD's  Recommended Disposition: Home w/Family     Subjective:     Patient was seen and examined. No acute events overnight. Discussed with RN overnight events. All patient's questions were answered.     \"I am doing fine\"    Patient's wife at bedside, all questions were answered    Review of Systems:  Symptom Y/N Comments  Symptom Y/N Comments   Fever/Chills n   Chest Pain n    Poor Appetite    Edema     Cough n   Abdominal Pain n    Sputum    Joint Pain     SOB/BERMAN n   Pruritis/Rash     Nausea/vomit n   Tolerating PT/OT     Diarrhea    Tolerating Diet npo   Constipation    Other       Could NOT obtain due to:          Objective:     VITALS:   Last 24hrs VS reviewed since prior progress note.  Most recent are:  Patient Vitals for the past 24 hrs:   Temp Pulse Resp BP SpO2   10/10/21 1225  (!) 59 15 (!) 97/56 96 %   10/10/21 1139   20 (!) 97/56 96 %   10/10/21 1057 98 °F (36.7 °C) 60 15 (!) 98/56 100 %   10/10/21 0754  61      10/10/21 0722 98 °F (36.7 °C) (!) 59 16 100/61 100 %   10/10/21 0430  (!) 56 8 98/62 99 %   10/10/21 0400  (!) 59 10 104/60 97 %   10/10/21 0352  (!) 59      10/10/21 0345 97.8 °F (36.6 °C) (!) 59 13 107/63 100 %   10/10/21 0015 98.5 °F (36.9 °C) 62 16 106/60    10/10/21 0000 98.7 °F (37.1 °C) 64 14 (!) 93/51 96 %   10/09/21 2330 98.5 °F (36.9 °C) 65 13 (!) 91/51 95 %   10/09/21 2300 98.7 °F (37.1 °C) 65 12 98/60 97 %   10/09/21 2230 98.3 °F (36.8 °C) 68 18 (!) 99/49 99 %   10/09/21 2215 98.7 °F (37.1 °C) 67 16 (!) 100/53 97 %   10/09/21 2200 98.6 °F (37 °C) 69 11 (!) 92/48 96 %   10/09/21 2145 98.4 °F (36.9 °C) 71 11 (!) 104/55 (!) 87 %   10/09/21 2135 98 °F (36.7 °C) 71 17 (!) 92/51 94 %   10/09/21 2130 97.9 °F (36.6 °C) 68 15 (!) 98/54 94 %   10/09/21 2125 97.9 °F (36.6 °C) 65 18 (!) 95/54 97 %   10/09/21 2120 98.9 °F (37.2 °C) 69 14 (!) 99/56 93 %   10/09/21 2115 97.7 °F (36.5 °C) 68 10 (!) 91/54 (!) 87 %   10/09/21 2100 97.5 °F (36.4 °C) 64 14 (!) 97/57 93 %   10/09/21 2000  62 19 (!) 94/47 96 %   10/09/21 1945  70 12 (!) 87/54 95 %   10/09/21 1900 97.6 °F (36.4 °C) 70 14 (!) 100/55 94 %   10/09/21 1630 98.8 °F (37.1 °C) 64 16 (!) 95/54 98 %   10/09/21 1600  67 14 (!) 99/55 93 %   10/09/21 1530 97.5 °F (36.4 °C) 66 15 (!) 98/52 98 %   10/09/21 1515  64 16 (!) 96/58 98 %   10/09/21 1445 98.2 °F (36.8 °C) 67 13 (!) 96/53 98 %   10/09/21 1400 99.2 °F (37.3 °C) 66 14 (!) 94/51 96 %   10/09/21 1345 97.4 °F (36.3 °C) 62 16 (!) 85/47 95 %   10/09/21 1330  66 13 (!) 91/52 96 %   10/09/21 1324 99 °F (37.2 °C) 64 14 (!) 88/55 98 %   10/09/21 1300  66 16 (!) 98/46 95 %       Intake/Output Summary (Last 24 hours) at 10/10/2021 1254  Last data filed at 10/10/2021 1240  Gross per 24 hour   Intake 726.3 ml   Output 2725 ml   Net -1998.7 ml        I had a face to face encounter and independently examined this patient on 10/10/2021, as outlined below:  PHYSICAL EXAM:  General: WD, WN. Alert, cooperative, no acute distress    EENT:  EOMI. Anicteric sclerae. MMM  Resp:  CTA bilaterally, no wheezing or rales. No accessory muscle use  CV:  Regular  rhythm,  No edema  GI:  Soft, Non distended, Non tender. +Bowel sounds  Neurologic:  Alert and oriented X 3, normal speech,   Psych:   Good insight. Not anxious nor agitated  Skin:  No rashes. No jaundice    Reviewed most current lab test results and cultures  YES  Reviewed most current radiology test results   YES  Review and summation of old records today    NO  Reviewed patient's current orders and MAR    YES  PMH/SH reviewed - no change compared to H&P  ________________________________________________________________________  Care Plan discussed with:    Comments   Patient x    Family  X    RN x    Care Manager     Consultant  X  GI                     Multidiciplinary team rounds were held today with , nursing, pharmacist and clinical coordinator. Patient's plan of care was discussed; medications were reviewed and discharge planning was addressed. ________________________________________________________________________  Total NON critical care TIME:  35  Minutes    Total CRITICAL CARE TIME Spent:   Minutes non procedure based      Comments   >50% of visit spent in counseling and coordination of care     ________________________________________________________________________  Crow Mueller MD     Procedures: see electronic medical records for all procedures/Xrays and details which were not copied into this note but were reviewed prior to creation of Plan.       LABS:  I reviewed today's most current labs and imaging studies. Pertinent labs include:  Recent Labs     10/10/21  1002 10/10/21  0343 10/09/21  1803 10/08/21  2333 10/08/21  1758   WBC  --   --  4.3  --  8.2   HGB 7.1* 7.1* 6.3*   < > 7.2*   HCT 21.0* 21.0* 18.6*   < > 21.2*   PLT  --   --  85*  --  113*    < > = values in this interval not displayed.      Recent Labs     10/09/21  1803 10/08/21  2151 10/08/21  1758     --  136   K 3.3*  --  2.8*     --  97   CO2 33*  --  29   *  --  232*   BUN 42*  --  56*   CREA 1.12  --  1.36*   CA 7.7*  --  9.0   MG 2.0  --   --    PHOS 2.6  --   --    ALB  --   --  2.8*   TBILI  --   --  0.3   ALT  --   --  32   INR  --  1.1  --        Signed: Guyann Prader, MD

## 2021-10-10 NOTE — ANESTHESIA POSTPROCEDURE EVALUATION
Procedure(s):  ESOPHAGOGASTRODUODENOSCOPY (EGD). general    Anesthesia Post Evaluation      Multimodal analgesia: multimodal analgesia used between 6 hours prior to anesthesia start to PACU discharge  Patient location during evaluation: PACU  Level of consciousness: sleepy but conscious  Pain management: adequate  Airway patency: patent  Anesthetic complications: no  Cardiovascular status: acceptable  Respiratory status: acceptable  Hydration status: acceptable  Comments: +Post-Anesthesia Evaluation and Assessment    Patient: Kindra HarrisBenitez MRN: [id number]  SSN: [id number]   YOB: 1960  Age: 59 y.o. Sex: male      Cardiovascular Function/Vital Signs    BP (!) 94/53   Pulse 68   Temp 36.9 °C (98.5 °F)   Resp 18   Ht 5' 6\" (1.676 m)   Wt 120.7 kg (266 lb)   SpO2 98%   BMI 42.93 kg/m²     Patient is status post Procedure(s) with comments:  ESOPHAGOGASTRODUODENOSCOPY (EGD) - Needs labs to result. Nausea/Vomiting: Controlled. Postoperative hydration reviewed and adequate. Pain:  Pain Scale 1: Numeric (0 - 10) (10/10/21 1405)  Pain Intensity 1: 0 (10/10/21 1405)   Managed. Neurological Status:   Neuro (WDL): Exceptions to WDL (10/10/21 1313)   At baseline. Mental Status and Level of Consciousness: Arousable. Pulmonary Status:   O2 Device: Nasal cannula (10/10/21 1405)   Adequate oxygenation and airway patent. Complications related to anesthesia: None    Post-anesthesia assessment completed. No concerns. Signed By: Kari Mendoza MD    10/10/2021  Post anesthesia nausea and vomiting:  controlled  Final Post Anesthesia Temperature Assessment:  Normothermia (36.0-37.5 degrees C)      INITIAL Post-op Vital signs:   Vitals Value Taken Time   BP 94/53 10/10/21 1440   Temp 36.9 °C (98.5 °F) 10/10/21 1319   Pulse 68 10/10/21 1445   Resp 14 10/10/21 1445   SpO2 96 % 10/10/21 1445   Vitals shown include unvalidated device data.

## 2021-10-10 NOTE — PERIOP NOTES
Handoff Report from Operating Room to PACU    Report received from RADHA Bang RN and JUAN Pereyra CRNA regarding Kindra Rosas. Surgeon(s):  Regina Arana MD  And Procedure(s) (LRB):  ESOPHAGOGASTRODUODENOSCOPY (EGD) (N/A)  confirmed   with allergies and dressings discussed. Anesthesia type, drugs, patient history, complications, estimated blood loss, vital signs, intake and output, and last pain medication, lines, reversal medications and temperature were reviewed.

## 2021-10-10 NOTE — PERIOP NOTES
TRANSFER - IN REPORT:    Verbal report received from Monica Reynolds RN(name) on Yehuda Moralez-Benitez  being received from 2272(unit) for ordered procedure      Report consisted of patients Situation, Background, Assessment and   Recommendations(SBAR). Information from the following report(s) SBAR, Kardex, ED Summary, OR Summary, Procedure Summary, Intake/Output, MAR, Accordion, Recent Results, Med Rec Status, Cardiac Rhythm NSR, Alarm Parameters , Pre Procedure Checklist, Procedure Verification and Quality Measures was reviewed with the receiving nurse. Opportunity for questions and clarification was provided. Assessment completed upon patients arrival to unit and care assumed. 1145 Pre-op was started by claudio, but completed by Endoscopy RNKhari

## 2021-10-10 NOTE — ANESTHESIA PREPROCEDURE EVALUATION
Relevant Problems   No relevant active problems       Anesthetic History   No history of anesthetic complications            Review of Systems / Medical History  Patient summary reviewed, nursing notes reviewed and pertinent labs reviewed    Pulmonary                   Neuro/Psych              Cardiovascular    Hypertension          Hyperlipidemia    Exercise tolerance: <4 METS  Comments: ECG (10/9/21):  Normal sinus rhythm   Right bundle branch block   No previous ECGs available   GI/Hepatic/Renal         Renal disease: ARF  Liver disease (Cirrhosis ([address]))    Comments: Hematemesis  Cirrhosis of liver due to WALTERS with ascites Endo/Other    Diabetes: type 2    Morbid obesity and anemia    Comments:  Thrombocytopenia (Platelets = 76S on 94/8/92)    Hypokalemia (K = 3.3 on 10/9/21) Other Findings   Comments: Ascites         Physical Exam    Airway  Mallampati: II  TM Distance: 4 - 6 cm  Neck ROM: normal range of motion   Mouth opening: Normal     Cardiovascular  Regular rate and rhythm,  S1 and S2 normal,  no murmur, click, rub, or gallop  Rhythm: regular  Rate: normal         Dental  No notable dental hx       Pulmonary  Breath sounds clear to auscultation               Abdominal  GI exam deferred       Other Findings            Anesthetic Plan    ASA: 4  Anesthesia type: MAC          Induction: Intravenous  Anesthetic plan and risks discussed with: Patient

## 2021-10-10 NOTE — PERIOP NOTES
TRANSFER - OUT REPORT:    Verbal report given to Nikkie Soto RN(name) on Galileo Rosas  being transferred to 2272(unit) for routine post - op       Report consisted of patients Situation, Background, Assessment and   Recommendations(SBAR). Information from the following report(s) SBAR, Kardex, ED Summary, OR Summary, Procedure Summary, Intake/Output, MAR, Accordion, Recent Results, Med Rec Status, Cardiac Rhythm NSR, Alarm Parameters , Pre Procedure Checklist, Procedure Verification and Quality Measures was reviewed with the receiving nurse. Opportunity for questions and clarification was provided.       Patient transported with:   Monitor  O2 @ 2 liters  Registered Nurse

## 2021-10-10 NOTE — PROGRESS NOTES
End of Shift Note    Bedside shift change report given to Brit Chavez RN (oncoming nurse) by Linda Mccurdy RN (offgoing nurse). Report included the following information SBAR, Kardex, Recent Results and Cardiac Rhythm NSR     Shift worked:  7p - 7a     Shift summary and any significant changes:    2115: 1 units PRBC infused for HGB 6.3. Poss EGD today      Concerns for physician to address:  HGB 7.1. Zone phone for oncoming shift:          Activity:  Activity Level: Up with Assistance  Number times ambulated in hallways past shift: 0  Number of times OOB to chair past shift: 0    Cardiac:   Cardiac Monitoring: Yes      Cardiac Rhythm: Sinus Kevin    Access:   Current line(s): PIV     Genitourinary:   Urinary status: voiding    Respiratory:   O2 Device: None (Room air)  Chronic home O2 use?: NO  Incentive spirometer at bedside: NO     GI:  Last Bowel Movement Date: 10/09/21  Current diet:  DIET NPO  Passing flatus: YES  Tolerating current diet: NPO       Pain Management:   Patient states pain is manageable on current regimen: YES    Skin:  Christopher Score: 19  Interventions: limit briefs    Patient Safety:  Fall Score:  Total Score: 4  Interventions: bed/chair alarm and pt to call before getting OOB  High Fall Risk: Yes    Length of Stay:  Expected LOS: - - -  Actual LOS: 2      Linda Mccurdy RN

## 2021-10-11 ENCOUNTER — APPOINTMENT (OUTPATIENT)
Dept: INTERVENTIONAL RADIOLOGY/VASCULAR | Age: 61
DRG: 263 | End: 2021-10-11
Attending: INTERNAL MEDICINE
Payer: COMMERCIAL

## 2021-10-11 LAB
ANION GAP SERPL CALC-SCNC: 2 MMOL/L (ref 5–15)
BUN SERPL-MCNC: 15 MG/DL (ref 6–20)
BUN/CREAT SERPL: 18 (ref 12–20)
CALCIUM SERPL-MCNC: 7.6 MG/DL (ref 8.5–10.1)
CHLORIDE SERPL-SCNC: 108 MMOL/L (ref 97–108)
CO2 SERPL-SCNC: 29 MMOL/L (ref 21–32)
CREAT SERPL-MCNC: 0.83 MG/DL (ref 0.7–1.3)
ERYTHROCYTE [DISTWIDTH] IN BLOOD BY AUTOMATED COUNT: 16.5 % (ref 11.5–14.5)
GLUCOSE BLD STRIP.AUTO-MCNC: 189 MG/DL (ref 65–117)
GLUCOSE BLD STRIP.AUTO-MCNC: 192 MG/DL (ref 65–117)
GLUCOSE BLD STRIP.AUTO-MCNC: 203 MG/DL (ref 65–117)
GLUCOSE BLD STRIP.AUTO-MCNC: 217 MG/DL (ref 65–117)
GLUCOSE BLD STRIP.AUTO-MCNC: 252 MG/DL (ref 65–117)
GLUCOSE SERPL-MCNC: 199 MG/DL (ref 65–100)
HCT VFR BLD AUTO: 20.7 % (ref 36.6–50.3)
HCT VFR BLD AUTO: 21.1 % (ref 36.6–50.3)
HCT VFR BLD AUTO: 22.9 % (ref 36.6–50.3)
HGB BLD-MCNC: 6.2 G/DL (ref 12.1–17)
HGB BLD-MCNC: 6.9 G/DL (ref 12.1–17)
HGB BLD-MCNC: 7.5 G/DL (ref 12.1–17)
MCH RBC QN AUTO: 29 PG (ref 26–34)
MCHC RBC AUTO-ENTMCNC: 32.7 G/DL (ref 30–36.5)
MCV RBC AUTO: 88.7 FL (ref 80–99)
NRBC # BLD: 0.03 K/UL (ref 0–0.01)
NRBC BLD-RTO: 1 PER 100 WBC
PLATELET # BLD AUTO: 78 K/UL (ref 150–400)
PMV BLD AUTO: 11.3 FL (ref 8.9–12.9)
POTASSIUM SERPL-SCNC: 3.5 MMOL/L (ref 3.5–5.1)
RBC # BLD AUTO: 2.38 M/UL (ref 4.1–5.7)
SERVICE CMNT-IMP: ABNORMAL
SODIUM SERPL-SCNC: 139 MMOL/L (ref 136–145)
WBC # BLD AUTO: 3 K/UL (ref 4.1–11.1)

## 2021-10-11 PROCEDURE — 77030004566 HC CATH ANGI DX TORCON COOK -B

## 2021-10-11 PROCEDURE — 3E033TZ INTRODUCTION OF DESTRUCTIVE AGENT INTO PERIPHERAL VEIN, PERCUTANEOUS APPROACH: ICD-10-PCS | Performed by: STUDENT IN AN ORGANIZED HEALTH CARE EDUCATION/TRAINING PROGRAM

## 2021-10-11 PROCEDURE — 74011636637 HC RX REV CODE- 636/637: Performed by: HOSPITALIST

## 2021-10-11 PROCEDURE — C1769 GUIDE WIRE: HCPCS

## 2021-10-11 PROCEDURE — C1889 IMPLANT/INSERT DEVICE, NOC: HCPCS

## 2021-10-11 PROCEDURE — 74011250636 HC RX REV CODE- 250/636: Performed by: INTERNAL MEDICINE

## 2021-10-11 PROCEDURE — 77010033678 HC OXYGEN DAILY

## 2021-10-11 PROCEDURE — 2709999900 HC NON-CHARGEABLE SUPPLY

## 2021-10-11 PROCEDURE — 36011 PLACE CATHETER IN VEIN: CPT

## 2021-10-11 PROCEDURE — 82962 GLUCOSE BLOOD TEST: CPT

## 2021-10-11 PROCEDURE — 80048 BASIC METABOLIC PNL TOTAL CA: CPT

## 2021-10-11 PROCEDURE — 75898 FOLLOW-UP ANGIOGRAPHY: CPT

## 2021-10-11 PROCEDURE — 74011000636 HC RX REV CODE- 636: Performed by: STUDENT IN AN ORGANIZED HEALTH CARE EDUCATION/TRAINING PROGRAM

## 2021-10-11 PROCEDURE — C1892 INTRO/SHEATH,FIXED,PEEL-AWAY: HCPCS

## 2021-10-11 PROCEDURE — 85014 HEMATOCRIT: CPT

## 2021-10-11 PROCEDURE — 74011250636 HC RX REV CODE- 250/636: Performed by: STUDENT IN AN ORGANIZED HEALTH CARE EDUCATION/TRAINING PROGRAM

## 2021-10-11 PROCEDURE — 77030010324 HC TBNG CNTRST MRTM -A

## 2021-10-11 PROCEDURE — C1894 INTRO/SHEATH, NON-LASER: HCPCS

## 2021-10-11 PROCEDURE — 74011250636 HC RX REV CODE- 250/636: Performed by: HOSPITALIST

## 2021-10-11 PROCEDURE — 74011636637 HC RX REV CODE- 636/637: Performed by: EMERGENCY MEDICINE

## 2021-10-11 PROCEDURE — 74011000250 HC RX REV CODE- 250: Performed by: STUDENT IN AN ORGANIZED HEALTH CARE EDUCATION/TRAINING PROGRAM

## 2021-10-11 PROCEDURE — 74011000250 HC RX REV CODE- 250: Performed by: HOSPITALIST

## 2021-10-11 PROCEDURE — 77030019698 HC SYR ANGI MDLON MRTM -A

## 2021-10-11 PROCEDURE — 75840 VEIN X-RAY ADRENAL GLAND: CPT

## 2021-10-11 PROCEDURE — 74011000258 HC RX REV CODE- 258: Performed by: INTERNAL MEDICINE

## 2021-10-11 PROCEDURE — 85027 COMPLETE CBC AUTOMATED: CPT

## 2021-10-11 PROCEDURE — 65660000000 HC RM CCU STEPDOWN

## 2021-10-11 PROCEDURE — 36415 COLL VENOUS BLD VENIPUNCTURE: CPT

## 2021-10-11 PROCEDURE — 06LY3DZ OCCLUSION OF LOWER VEIN WITH INTRALUMINAL DEVICE, PERCUTANEOUS APPROACH: ICD-10-PCS | Performed by: STUDENT IN AN ORGANIZED HEALTH CARE EDUCATION/TRAINING PROGRAM

## 2021-10-11 PROCEDURE — 74011250636 HC RX REV CODE- 250/636: Performed by: EMERGENCY MEDICINE

## 2021-10-11 PROCEDURE — 74011250637 HC RX REV CODE- 250/637: Performed by: HOSPITALIST

## 2021-10-11 PROCEDURE — C9113 INJ PANTOPRAZOLE SODIUM, VIA: HCPCS | Performed by: HOSPITALIST

## 2021-10-11 PROCEDURE — C1725 CATH, TRANSLUMIN NON-LASER: HCPCS

## 2021-10-11 PROCEDURE — 77030014021 HC SYR ANGI FIX LOK MRTM -A

## 2021-10-11 PROCEDURE — 99153 MOD SED SAME PHYS/QHP EA: CPT

## 2021-10-11 PROCEDURE — 77030009378 HC DEV TORQ OLCT F/GWIRE MANIP COOK -A

## 2021-10-11 PROCEDURE — C1887 CATHETER, GUIDING: HCPCS

## 2021-10-11 RX ORDER — SODIUM TETRADECYL SULFATE 30 MG/ML
180 INJECTION, SOLUTION INTRAVENOUS ONCE
Status: COMPLETED | OUTPATIENT
Start: 2021-10-11 | End: 2021-10-11

## 2021-10-11 RX ORDER — HEPARIN SODIUM 200 [USP'U]/100ML
800 INJECTION, SOLUTION INTRAVENOUS ONCE
Status: COMPLETED | OUTPATIENT
Start: 2021-10-11 | End: 2021-10-11

## 2021-10-11 RX ORDER — FENTANYL CITRATE 50 UG/ML
100 INJECTION, SOLUTION INTRAMUSCULAR; INTRAVENOUS
Status: DISCONTINUED | OUTPATIENT
Start: 2021-10-11 | End: 2021-10-11

## 2021-10-11 RX ORDER — SODIUM CHLORIDE 9 MG/ML
25 INJECTION, SOLUTION INTRAVENOUS CONTINUOUS
Status: DISCONTINUED | OUTPATIENT
Start: 2021-10-11 | End: 2021-10-11

## 2021-10-11 RX ORDER — LIDOCAINE HYDROCHLORIDE 20 MG/ML
18 INJECTION, SOLUTION INFILTRATION; PERINEURAL ONCE
Status: COMPLETED | OUTPATIENT
Start: 2021-10-11 | End: 2021-10-11

## 2021-10-11 RX ORDER — MIDAZOLAM HYDROCHLORIDE 1 MG/ML
0-10 INJECTION, SOLUTION INTRAMUSCULAR; INTRAVENOUS
Status: DISCONTINUED | OUTPATIENT
Start: 2021-10-11 | End: 2021-10-11

## 2021-10-11 RX ADMIN — MIDAZOLAM 1 MG: 1 INJECTION INTRAMUSCULAR; INTRAVENOUS at 12:38

## 2021-10-11 RX ADMIN — HEPARIN SODIUM IN SODIUM CHLORIDE 1600 UNITS: 200 INJECTION INTRAVENOUS at 13:54

## 2021-10-11 RX ADMIN — OCTREOTIDE ACETATE 50 MCG/HR: 500 INJECTION, SOLUTION INTRAVENOUS; SUBCUTANEOUS at 18:24

## 2021-10-11 RX ADMIN — MIDAZOLAM 1 MG: 1 INJECTION INTRAMUSCULAR; INTRAVENOUS at 13:05

## 2021-10-11 RX ADMIN — SODIUM CHLORIDE 50 ML/HR: 9 INJECTION, SOLUTION INTRAVENOUS at 22:42

## 2021-10-11 RX ADMIN — ACETAMINOPHEN 325 MG: 325 TABLET ORAL at 03:45

## 2021-10-11 RX ADMIN — LIDOCAINE HYDROCHLORIDE 200 MG: 20 INJECTION, SOLUTION INFILTRATION; PERINEURAL at 13:00

## 2021-10-11 RX ADMIN — SODIUM CHLORIDE 50 ML/HR: 9 INJECTION, SOLUTION INTRAVENOUS at 03:48

## 2021-10-11 RX ADMIN — SODIUM CHLORIDE 40 MG: 9 INJECTION INTRAMUSCULAR; INTRAVENOUS; SUBCUTANEOUS at 22:12

## 2021-10-11 RX ADMIN — Medication 10 ML: at 06:05

## 2021-10-11 RX ADMIN — FENTANYL CITRATE 50 MCG: 50 INJECTION INTRAMUSCULAR; INTRAVENOUS at 12:54

## 2021-10-11 RX ADMIN — INSULIN LISPRO 2 UNITS: 100 INJECTION, SOLUTION INTRAVENOUS; SUBCUTANEOUS at 06:04

## 2021-10-11 RX ADMIN — CEFTRIAXONE 1 G: 1 INJECTION, POWDER, FOR SOLUTION INTRAMUSCULAR; INTRAVENOUS at 22:13

## 2021-10-11 RX ADMIN — FENTANYL CITRATE 50 MCG: 50 INJECTION INTRAMUSCULAR; INTRAVENOUS at 12:39

## 2021-10-11 RX ADMIN — Medication 10 ML: at 22:13

## 2021-10-11 RX ADMIN — IOPAMIDOL 70 ML: 755 INJECTION, SOLUTION INTRAVENOUS at 13:00

## 2021-10-11 RX ADMIN — SODIUM CHLORIDE 40 MG: 9 INJECTION INTRAMUSCULAR; INTRAVENOUS; SUBCUTANEOUS at 08:07

## 2021-10-11 RX ADMIN — TETRADECYL HYDROGEN SULFATE (ESTER) 180 MG: 30 INJECTION, SOLUTION INTRAVENOUS at 13:02

## 2021-10-11 RX ADMIN — OCTREOTIDE ACETATE 50 MCG/HR: 500 INJECTION, SOLUTION INTRAVENOUS; SUBCUTANEOUS at 03:46

## 2021-10-11 RX ADMIN — Medication 10 ML: at 15:36

## 2021-10-11 RX ADMIN — INSULIN LISPRO 1 UNITS: 100 INJECTION, SOLUTION INTRAVENOUS; SUBCUTANEOUS at 23:52

## 2021-10-11 NOTE — PROGRESS NOTES
GI PROGRESS NOTE  Edgar Benítez, BAMBI  [phone] NP in-hospital cell phone M-F until 4:30  After 5pm or on weekends, please call  for physician on call    Essence Chiang StevenBenitez :1960 BRM:[id number]   ATTG: Dr Sherman Valverde  PCP: None  Date/Time:  10/11/2021 1130 AM     Primary GI: Dr. Rebecca Brown - Dr Boris Stone covering  Reason for following: Anemia, cirrhosis    Assessment:   Upper GI Bleed - Melena/ Hematemesis  WALTERS cirrhosis  Gastric Varices on CT and EGD   EGD 10/10/21 : -- isolated gastric varix (IGV1 or gastric fundal varix) with overlying clot along the greater curve of the stomach. No active bleeding identified, though this is almost certainly the source of bleeding and at elevated risk of rebleeding off -- no esophageal varices   CT :  Large gastric varices along the gastric fundus. No evidence of active GI bleed. Cirrhosis. - No abdominal pain, nausea, vomiting today  - No melena today  - Hgb 6.9 today, plt 78    Obesity, HTN, Hyperlipidemia, Thombocytopenia        Plan:   - BRTO (balloon-occluded retrograde transvenous obliteration) today with IR - appreciate their assistance  - Continue octreotide gtt until after above procedure and stable - anticipate ~ 72 hours  - Continue ceftriaxone x 7 days  - Continue BID PPI  - Transfuse for Hgb 6-8    Will need outpatient follow up with primary GI in [place] upon discharge. Plan discussed with Dr Boris Stone. Will continue to follow. Subjective:   Discussed with RN events overnight. No complaints, feeling okay today. Appreciative of all assistance with getting better.      Complaint Y/N Description   Abdominal Pain n    Hematemesis n    Hematochezia n    Melena n  none today   Constipation n    Diarrhea n    Dyspepsia n    Dysphagia n    Jaundiced n    Nausea/vomiting n      Review of Systems:  Symptom Y/N Comments  Symptom Y/N Comments   Fever/Chills    Chest Pain     Cough    Headaches     Sputum    Joint Pain     SOB/BEMRAN    Pruritis/Rash Tolerating Diet  npo  Other       Could NOT obtain due to:      Objective:   VITALS:   Last 24hrs VS reviewed since prior progress note. Most recent are:  Visit Vitals  /70 (BP 1 Location: Right upper arm, BP Patient Position: Supine)   Pulse 62   Temp 98.3 °F (36.8 °C)   Resp 17   Ht 5' 6\" (1.676 m)   Wt 120.7 kg (266 lb)   SpO2 96%   BMI 42.93 kg/m²       Intake/Output Summary (Last 24 hours) at 10/11/2021 1247  Last data filed at 10/11/2021 0500  Gross per 24 hour   Intake 3887.51 ml   Output 2625 ml   Net 1262.51 ml     PHYSICAL EXAM:  General: WD, obese. Alert, cooperative, no acute distress    HEENT: NC, Atraumatic. Anicteric sclerae. Lungs:  CTA Bilaterally. No Wheezing/Rhonchi/Rales. Heart:  Regular  rhythm,  No murmur, No Rubs, No Gallops  Abdomen: Soft, Non distended, Non tender.  +Bowel sounds, no HSM  Extremities: No c/c/e  Neurologic:  Alert and oriented X 3. No acute neurological distress   Psych:   Good insight. Not anxious nor agitated. Lab and Radiology Data Reviewed: (see below)    Medications Reviewed: (see below)  PMH/SH reviewed - no change compared to H&P  ________________________________________________________________________  Total time spent with patient: 30 minutes ________________________________________________________________________  Care Plan discussed with:  Patient y   Family  y - spouse and son at bedside   RN y - [facility], RN              Consultant: Marizol Palumbo NP     Procedures: see electronic medical records for all procedures/Xrays and details which were not copied into this note but were reviewed prior to creation of Plan. LABS:  Recent Labs     10/11/21  0600 10/10/21  2148 10/10/21  0343 10/09/21  1803   WBC 3.0*  --   --  4.3   HGB 6.9* 7.2*   < > 6.3*   HCT 21.1* 21.3*   < > 18.6*   PLT 78*  --   --  85*    < > = values in this interval not displayed.      Recent Labs     10/11/21  0600 10/09/21  1803 10/08/21  1758    141 136   K 3.5 3. 3* 2.8*    108 97   CO2 29 33* 29   BUN 15 42* 56*   CREA 0.83 1.12 1.36*   * 198* 232*   CA 7.6* 7.7* 9.0   MG  --  2.0  --    PHOS  --  2.6  --      Recent Labs     10/08/21  1758   AP 51   TP 6.3*   ALB 2.8*   GLOB 3.5   LPSE 129     Recent Labs     10/08/21  2151   INR 1.1   PTP 11.6*   APTT <20.0*      No results for input(s): FE, TIBC, PSAT, FERR in the last 72 hours. No results found for: FOL, RBCF  No results for input(s): PH, PCO2, PO2 in the last 72 hours. No results for input(s): CPK, CKMB in the last 72 hours.     No lab exists for component: TROPONINI  No results found for: COLOR, APPRN, SPGRU, REFSG, LAYO, PROTU, GLUCU, KETU, BILU, UROU, MANUEL, LEUKU, GLUKE, EPSU, BACTU, WBCU, RBCU, CASTS, UCRY    MEDICATIONS:  Current Facility-Administered Medications   Medication Dose Route Frequency    sodium tetradecyl sulfate (SOTRADECOL) 3 % (30 mg/mL) injection soln 180 mg  180 mg IntraVENous ONCE    heparinized saline 2 units/mL infusion 1,600 Units  800 mL Irrigation ONCE    lidocaine (XYLOCAINE) 20 mg/mL (2 %) injection 360 mg  18 mL SubCUTAneous ONCE    iopamidoL (ISOVUE-370) 76 % injection 300 mL  300 mL IntraarTERial ONCE    midazolam (VERSED) injection 0-10 mg  0-10 mg IntraVENous Multiple    0.9% sodium chloride infusion  25 mL/hr IntraVENous CONTINUOUS    fentaNYL citrate (PF) injection 100 mcg  100 mcg IntraVENous Multiple    sodium chloride (NS) flush 5-40 mL  5-40 mL IntraVENous Q8H    sodium chloride (NS) flush 5-40 mL  5-40 mL IntraVENous PRN    simethicone (MYLICON) 04HI/6.2OA oral drops 80 mg  1.2 mL Oral Multiple    octreotide (SANDOSTATIN) 500 mcg in 0.9% sodium chloride 500 mL infusion  50 mcg/hr IntraVENous CONTINUOUS    pantoprazole (PROTONIX) 40 mg in 0.9% sodium chloride 10 mL injection  40 mg IntraVENous Q12H    insulin lispro (HUMALOG) injection   SubCUTAneous Q6H    glucose chewable tablet 16 g  4 Tablet Oral PRN    dextrose (D50W) injection syrg 12.5-25 g 12.5-25 g IntraVENous PRN    glucagon (GLUCAGEN) injection 1 mg  1 mg IntraMUSCular PRN    acetaminophen (TYLENOL) tablet 325 mg  325 mg Oral Q8H PRN    ondansetron (ZOFRAN) injection 4 mg  4 mg IntraVENous Q4H PRN    cefTRIAXone (ROCEPHIN) 1 g in 0.9% sodium chloride (MBP/ADV) 50 mL MBP  1 g IntraVENous Q24H    0.9% sodium chloride infusion  50 mL/hr IntraVENous CONTINUOUS

## 2021-10-11 NOTE — PROGRESS NOTES
End of Shift Note    Bedside shift change report given to Curtis Traore RN (oncoming nurse) by Magalie Shields RN (offgoing nurse). Report included the following information SBAR, Kardex, Recent Results and Med Rec Status    Shift worked:  7p - 7a      Shift summary and any significant changes:    AM HGB 6.9. NP notified. Concerns for physician to address: HGB 6.9     Zone phone for oncoming shift:          Activity:  Activity Level: Up with Assistance  Number times ambulated in hallways past shift: 0  Number of times OOB to chair past shift: 0    Cardiac:   Cardiac Monitoring: Yes      Cardiac Rhythm: Sinus Rhythm    Access:   Current line(s): PIV     Genitourinary:   Urinary status: voiding    Respiratory:   O2 Device: Nasal cannula  Chronic home O2 use?: NO  Incentive spirometer at bedside: NO     GI:  Last Bowel Movement Date: 10/09/21  Current diet:  DIET NPO  DIET ONE TIME MESSAGE  Passing flatus: YES  Tolerating current diet: YES       Pain Management:   Patient states pain is manageable on current regimen: YES    Skin:  Christopher Score: 19  Interventions: turn team and limit briefs    Patient Safety:  Fall Score:  Total Score: 4  Interventions: bed/chair alarm and pt to call before getting OOB  High Fall Risk: Yes    Length of Stay:  Expected LOS: - - -  Actual LOS: 3      Magalie Shields RN

## 2021-10-11 NOTE — PROGRESS NOTES
Transition of Care Plan:     RUR: 14%  Disposition: Home with spouse  Follow up appointments: PCP, GI?  DME needed: None anticipated  Transportation at Discharge: Pt's spouse  [address] or means to access home:  Pt has access      IM Medicare Letter: N/A - commercial insurance   Is patient a BCPI-A Bundle:  N/A                If yes, was Bundle Letter given?:     Caregiver Contact: Pt's spouse, Casie Manzanares Kirt-Benitez p) [phone]  Discharge Caregiver contacted prior to discharge? To be contacted prior to d/c      CM reviewed pt's chart. Pt went to radiology today for a procedure. CM will continue to follow and assist with d/c planning. Debi Brennan.[place].   Care Manager [id number] [facility]  [phone]

## 2021-10-11 NOTE — PROGRESS NOTES
1125: Transport here to take patient BRYAN to radiology. 1435: Report received from radiology. Patient to be transferred back to [facility].     1450: Patient back in room, awake and alert, VSS, family at bedside. R groin site clean,dry, intact. pulses audible with dopplar. Educated patient on lying flat for 1 hr, then 2 hours bedrest with head elevated. Patient verbalizes understanding. 1515: H&H drawn and sent to lab. 1550: Noted Hgb 7.5.    1755:Paged GI about patient diet order. 1805: Spoke to on call GI Dr. Maria Del Rosario Dumont. Orders received for clear liquid diet. End of Shift Note    Bedside shift change report given to Jarod James (oncoming nurse) by Jacek Gomez RN (offgoing nurse). Report included the following information SBAR, Kardex, Procedure Summary, Intake/Output, MAR, Recent Results and Med Rec Status    Shift worked:  7a-7p     Shift summary and any significant changes:    See above. Concerns for physician to address:    Zone phone for oncoming shift:       Activity:  Activity Level: Up with Assistance  Number times ambulated in hallways past shift: 0  Number of times OOB to chair past shift: 0    Cardiac:   Cardiac Monitoring: Yes      Cardiac Rhythm: Sinus Rhythm    Access:   Current line(s): PIV     Genitourinary:   Urinary status: voiding    Respiratory:   O2 Device: None (Room air)  Chronic home O2 use?: NO  Incentive spirometer at bedside: NO     GI:  Last Bowel Movement Date: 10/09/21  Current diet:  DIET ONE TIME MESSAGE  ADULT DIET Clear Liquid  Passing flatus: YES  Tolerating current diet: YES       Pain Management:   Patient states pain is manageable on current regimen: YES    Skin:  Christopher Score: 20  Interventions: increase time out of bed    Patient Safety:  Fall Score:  Total Score: 2  Interventions: pt to call before getting OOB  High Fall Risk: Yes    Length of Stay:  Expected LOS: - - -  Actual LOS: 3      Jacek Gomez RN

## 2021-10-11 NOTE — PROGRESS NOTES
Patient arrived from room, alert & oriented X4, on room air. Consent obtained  After MD Candie Hanna spoke.

## 2021-10-11 NOTE — PROGRESS NOTES
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  IR were consulted to proceed with BRTO  Stat labs were ordered  CTX x 7 days for prophylaxis     CT: Large gastric varices along the gastric fundus. No evidence of active GI bleed. Cirrhosis.     DAYTON  Hypokalemia  Resolved with IV fluids and replacement     DM type II   hold home p.o. medications for n.p.o. Cover with sliding scale as needed  Closely monitor for hypoglycemia     Hypertension  BP low side  Hold home medications     Hyperlipidemia  Continue statin on discharge     Thrombocytopenia  Mild, likely due to cirrhosis  Monitor closely       40 or above Morbid obesity / Body mass index is 42.93 kg/m². Estimated discharge date:   Barriers: clinical improvement    Code status: Full  Prophylaxis: SCD's  Recommended Disposition: Home w/Family     Subjective:     Patient was seen and examined. No acute events overnight. Discussed with RN overnight events. All patient's questions were answered. Patient's wife and son at bedside, all questions were answered.     \"Doing okay\"    Review of Systems:  Symptom Y/N Comments  Symptom Y/N Comments   Fever/Chills n   Chest Pain n    Poor Appetite    Edema     Cough n   Abdominal Pain n    Sputum    Joint Pain     SOB/BERMAN n Pruritis/Rash     Nausea/vomit n   Tolerating PT/OT     Diarrhea    Tolerating Diet  npo   Constipation    Other       Could NOT obtain due to:          Objective:     VITALS:   Last 24hrs VS reviewed since prior progress note.  Most recent are:  Patient Vitals for the past 24 hrs:   Temp Pulse Resp BP SpO2   10/11/21 1335  (!) 55 12 95/64 99 %   10/11/21 1330  (!) 57 12 95/63 99 %   10/11/21 1325  60 17 110/63 98 %   10/11/21 1320  62 15 94/61 96 %   10/11/21 1315  63 16 108/67 97 %   10/11/21 1310  65 16 106/64 96 %   10/11/21 1305  62 12 105/66 99 %   10/11/21 1300  60 19 104/70 99 %   10/11/21 1255  (!) 59 14 (!) 100/59 98 %   10/11/21 1250  (!) 59 15 103/64 96 %   10/11/21 1245  62 17 103/70 96 %   10/11/21 1240  61 14 104/66 98 %   10/11/21 1235  (!) 59 19 105/66 100 %   10/11/21 1203  65 20 103/64 95 %   10/11/21 1124 98.3 °F (36.8 °C) 66 20 107/66 96 %   10/11/21 0900  (!) 59 16 (!) 94/56 93 %   10/11/21 0800  (!) 59      10/11/21 0728 98 °F (36.7 °C) 61 18 94/62 98 %   10/11/21 0400 98.2 °F (36.8 °C) 64 18 109/67 98 %   10/11/21 0000  72      10/10/21 2300 98.4 °F (36.9 °C) 74 14 115/65 98 %   10/10/21 2000 98.6 °F (37 °C) 70 15 103/61 97 %   10/10/21 1600  72 21 (!) 101/58 94 %   10/10/21 1553 98.5 °F (36.9 °C) 68 18 (!) 98/56 97 %   10/10/21 1545 99.1 °F (37.3 °C) 72 16 (!) 93/52 97 %   10/10/21 1530  67 13 (!) 91/51 96 %   10/10/21 1515  65 12 (!) 93/52 96 %   10/10/21 1500  67 15 (!) 95/53 98 %   10/10/21 1445  68 14 (!) 88/53 96 %   10/10/21 1440  68 18 (!) 94/53 98 %   10/10/21 1435  72 18 (!) 93/47 97 %   10/10/21 1430  66 13 (!) 93/51 97 %   10/10/21 1415  67 14 (!) 90/47 97 %   10/10/21 1405  65 13 (!) 93/41 96 %       Intake/Output Summary (Last 24 hours) at 10/11/2021 1401  Last data filed at 10/11/2021 0500  Gross per 24 hour   Intake 557.51 ml   Output 2625 ml   Net -2067.49 ml        I had a face to face encounter and independently examined this patient on 10/11/2021, as outlined below:  PHYSICAL EXAM:  General: WD, WN. Alert, cooperative, no acute distress    EENT:  EOMI. Anicteric sclerae. MMM  Resp:  CTA bilaterally, no wheezing or rales. No accessory muscle use  CV:  Regular  rhythm,  No edema  GI:  Soft, Non distended, Non tender. +Bowel sounds  Neurologic:  Alert and oriented X 3, normal speech,   Psych:   Good insight. Not anxious nor agitated  Skin:  No rashes. No jaundice    Reviewed most current lab test results and cultures  YES  Reviewed most current radiology test results   YES  Review and summation of old records today    NO  Reviewed patient's current orders and MAR    YES  PMH/SH reviewed - no change compared to H&P  ________________________________________________________________________  Care Plan discussed with:    Comments   Patient x    Family  X    RN x    Care Manager     Consultant  X  GI                     Multidiciplinary team rounds were held today with , nursing, pharmacist and clinical coordinator. Patient's plan of care was discussed; medications were reviewed and discharge planning was addressed. ________________________________________________________________________  Total NON critical care TIME:  35  Minutes    Total CRITICAL CARE TIME Spent:   Minutes non procedure based      Comments   >50% of visit spent in counseling and coordination of care     ________________________________________________________________________  Pieter Lopez MD     Procedures: see electronic medical records for all procedures/Xrays and details which were not copied into this note but were reviewed prior to creation of Plan. LABS:  I reviewed today's most current labs and imaging studies.   Pertinent labs include:  Recent Labs     10/11/21  0600 10/10/21  2148 10/10/21  1632 10/10/21  0343 10/09/21  1803 10/08/21  2333 10/08/21  1758   WBC 3.0*  --   --   --  4.3  --  8.2   HGB 6.9* 7.2* 7.3*   < > 6.3*   < > 7.2*   HCT 21.1* 21.3* 22.3*   < > 18.6*   < > 21.2*   PLT 78*  --   --   --  85*  --  113*    < > = values in this interval not displayed.      Recent Labs     10/11/21  0600 10/09/21  1803 10/08/21  2151 10/08/21  1758    141  --  136   K 3.5 3.3*  --  2.8*    108  --  97   CO2 29 33*  --  29   * 198*  --  232*   BUN 15 42*  --  56*   CREA 0.83 1.12  --  1.36*   CA 7.6* 7.7*  --  9.0   MG  --  2.0  --   --    PHOS  --  2.6  --   --    ALB  --   --   --  2.8*   TBILI  --   --   --  0.3   ALT  --   --   --  32   INR  --   --  1.1  --        Signed: Darleen Manley MD

## 2021-10-12 LAB
ANION GAP SERPL CALC-SCNC: 3 MMOL/L (ref 5–15)
BUN SERPL-MCNC: 11 MG/DL (ref 6–20)
BUN/CREAT SERPL: 16 (ref 12–20)
CALCIUM SERPL-MCNC: 7.4 MG/DL (ref 8.5–10.1)
CHLORIDE SERPL-SCNC: 106 MMOL/L (ref 97–108)
CO2 SERPL-SCNC: 28 MMOL/L (ref 21–32)
CREAT SERPL-MCNC: 0.69 MG/DL (ref 0.7–1.3)
ERYTHROCYTE [DISTWIDTH] IN BLOOD BY AUTOMATED COUNT: 17.2 % (ref 11.5–14.5)
GLUCOSE BLD STRIP.AUTO-MCNC: 163 MG/DL (ref 65–117)
GLUCOSE BLD STRIP.AUTO-MCNC: 207 MG/DL (ref 65–117)
GLUCOSE BLD STRIP.AUTO-MCNC: 223 MG/DL (ref 65–117)
GLUCOSE BLD STRIP.AUTO-MCNC: 231 MG/DL (ref 65–117)
GLUCOSE BLD STRIP.AUTO-MCNC: 245 MG/DL (ref 65–117)
GLUCOSE SERPL-MCNC: 156 MG/DL (ref 65–100)
HCT VFR BLD AUTO: 21.1 % (ref 36.6–50.3)
HCT VFR BLD AUTO: 23.2 % (ref 36.6–50.3)
HGB BLD-MCNC: 7.1 G/DL (ref 12.1–17)
HGB BLD-MCNC: 7.8 G/DL (ref 12.1–17)
MCH RBC QN AUTO: 29.7 PG (ref 26–34)
MCHC RBC AUTO-ENTMCNC: 33.6 G/DL (ref 30–36.5)
MCV RBC AUTO: 88.2 FL (ref 80–99)
NRBC # BLD: 0.05 K/UL (ref 0–0.01)
NRBC BLD-RTO: 1.9 PER 100 WBC
PLATELET # BLD AUTO: 85 K/UL (ref 150–400)
PMV BLD AUTO: 10.9 FL (ref 8.9–12.9)
POTASSIUM SERPL-SCNC: 3.3 MMOL/L (ref 3.5–5.1)
RBC # BLD AUTO: 2.63 M/UL (ref 4.1–5.7)
SERVICE CMNT-IMP: ABNORMAL
SODIUM SERPL-SCNC: 137 MMOL/L (ref 136–145)
WBC # BLD AUTO: 2.7 K/UL (ref 4.1–11.1)

## 2021-10-12 PROCEDURE — 74011000250 HC RX REV CODE- 250: Performed by: HOSPITALIST

## 2021-10-12 PROCEDURE — 74011636637 HC RX REV CODE- 636/637: Performed by: EMERGENCY MEDICINE

## 2021-10-12 PROCEDURE — 74011250636 HC RX REV CODE- 250/636: Performed by: INTERNAL MEDICINE

## 2021-10-12 PROCEDURE — 74011000258 HC RX REV CODE- 258: Performed by: INTERNAL MEDICINE

## 2021-10-12 PROCEDURE — 36415 COLL VENOUS BLD VENIPUNCTURE: CPT

## 2021-10-12 PROCEDURE — 74011636637 HC RX REV CODE- 636/637: Performed by: INTERNAL MEDICINE

## 2021-10-12 PROCEDURE — 82962 GLUCOSE BLOOD TEST: CPT

## 2021-10-12 PROCEDURE — 85027 COMPLETE CBC AUTOMATED: CPT

## 2021-10-12 PROCEDURE — 74011250636 HC RX REV CODE- 250/636: Performed by: HOSPITALIST

## 2021-10-12 PROCEDURE — 80048 BASIC METABOLIC PNL TOTAL CA: CPT

## 2021-10-12 PROCEDURE — 74011250636 HC RX REV CODE- 250/636: Performed by: EMERGENCY MEDICINE

## 2021-10-12 PROCEDURE — 65660000000 HC RM CCU STEPDOWN

## 2021-10-12 PROCEDURE — C9113 INJ PANTOPRAZOLE SODIUM, VIA: HCPCS | Performed by: HOSPITALIST

## 2021-10-12 PROCEDURE — 74011250637 HC RX REV CODE- 250/637: Performed by: INTERNAL MEDICINE

## 2021-10-12 RX ORDER — INSULIN LISPRO 100 [IU]/ML
INJECTION, SOLUTION INTRAVENOUS; SUBCUTANEOUS
Status: DISCONTINUED | OUTPATIENT
Start: 2021-10-12 | End: 2021-10-14 | Stop reason: HOSPADM

## 2021-10-12 RX ADMIN — SODIUM CHLORIDE 40 MG: 9 INJECTION INTRAMUSCULAR; INTRAVENOUS; SUBCUTANEOUS at 21:40

## 2021-10-12 RX ADMIN — POTASSIUM BICARBONATE 40 MEQ: 782 TABLET, EFFERVESCENT ORAL at 10:47

## 2021-10-12 RX ADMIN — Medication 10 ML: at 05:58

## 2021-10-12 RX ADMIN — CEFTRIAXONE 1 G: 1 INJECTION, POWDER, FOR SOLUTION INTRAMUSCULAR; INTRAVENOUS at 21:49

## 2021-10-12 RX ADMIN — INSULIN LISPRO 1 UNITS: 100 INJECTION, SOLUTION INTRAVENOUS; SUBCUTANEOUS at 21:41

## 2021-10-12 RX ADMIN — INSULIN LISPRO 2 UNITS: 100 INJECTION, SOLUTION INTRAVENOUS; SUBCUTANEOUS at 17:05

## 2021-10-12 RX ADMIN — Medication 10 ML: at 12:21

## 2021-10-12 RX ADMIN — SODIUM CHLORIDE 40 MG: 9 INJECTION INTRAMUSCULAR; INTRAVENOUS; SUBCUTANEOUS at 08:44

## 2021-10-12 RX ADMIN — INSULIN LISPRO 2 UNITS: 100 INJECTION, SOLUTION INTRAVENOUS; SUBCUTANEOUS at 12:18

## 2021-10-12 RX ADMIN — Medication 10 ML: at 21:58

## 2021-10-12 RX ADMIN — OCTREOTIDE ACETATE 50 MCG/HR: 500 INJECTION, SOLUTION INTRAVENOUS; SUBCUTANEOUS at 07:40

## 2021-10-12 NOTE — PROGRESS NOTES
1900 Bedside and Verbal shift change report given to Anh Ga RN and Freddie Dillard RN (oncoming nurse) by Lissy Fontaine RN and Venkat Jc RN (offgoing nurse). Report included the following information SBAR, Kardex, ED Summary, OR Summary, Procedure Summary, MAR, Recent Results and Cardiac Rhythm NSR     0700 End of Shift Note    Bedside shift change report given to Mamie Simmons RN (oncoming nurse) by Juancarlos Ch RN (offgoing nurse). Report included the following information SBAR, OR Summary, Intake/Output, MAR, Recent Results and Cardiac Rhythm NSR    Shift worked:  6784-7964     Shift summary and any significant changes:     none     Concerns for physician to address:  none       Activity:  Activity Level: Up with Assistance  Number times ambulated in hallways past shift: 0  Number of times OOB to chair past shift: 0    Cardiac:   Cardiac Monitoring: Yes      Cardiac Rhythm: Sinus Rhythm    Access:   Current line(s): PIV     Genitourinary:   Urinary status: voiding    Respiratory:   O2 Device: None (Room air)     GI:  Last Bowel Movement Date: 10/09/21  Current diet:  DIET ONE TIME MESSAGE  ADULT DIET Regular; GI Walker (GERD/Peptic Ulcer)  Passing flatus: YES  Tolerating current diet: YES       Pain Management:   Patient states pain is manageable on current regimen: YES    Skin:  Christopher Score: 19  Interventions: increase time out of bed    Patient Safety:  Fall Score:  Total Score: 3  Interventions: bed/chair alarm, gripper socks and pt to call before getting OOB  High Fall Risk: Yes    Length of Stay:  Expected LOS: 4d 14h  Actual LOS: 5      Juancarlos Ch RN

## 2021-10-12 NOTE — PROGRESS NOTES
0700: Bedside shift change report given to Troy Doll, RN and Aspen Dowling, RN (oncoming nurse) by Kerry Riedel, JORDON (offgoing nurse). Report included the following information SBAR, Kardex, Intake/Output, MAR and Recent Results. 0930: Dr. Hilda Srinivasan at bedside assessing patient. Will change patient to AC&HS since he is eating. HGB stable at this time, will continue current treatment. 1040: Mar, NP with GI at bedside. Will d/c Ocreotide, and change H&H checks to daily. Will also advance diet. Will sign off on patient. 1100: Verbal orders from Dr. Hilda Srinivasan to place [address] orders to tele. 1135: Verbal ordered from Dr. Hilda Srinivasan to d/c NS this evening after dinner. 1900: End of Shift Note    Bedside shift change report given to oncoming RN (oncoming nurse) by Juancho Nance (offgoing nurse). Report included the following information SBAR, Kardex, Intake/Output, MAR and Recent Results    Shift worked:  7977-7227     Shift summary and any significant changes:     octreotide off, H&H daily. GI signed of. D/C tomorrow. [place] orders     Concerns for physician to address:  none     Zone phone for oncoming shift:   XXX       Activity:  Activity Level: Up with Assistance  Number times ambulated in hallways past shift: 0  Number of times OOB to chair past shift: 1    Cardiac:   Cardiac Monitoring: Yes      Cardiac Rhythm: Sinus Rhythm    Access:   Current line(s): PIV     Genitourinary:   Urinary status: voiding    Respiratory:   O2 Device: None (Room air)  Chronic home O2 use?: NO  Incentive spirometer at bedside: N/A     GI:  Last Bowel Movement Date: 10/09/21  Current diet:  DIET ONE TIME MESSAGE  ADULT DIET Regular; GI Stafford (GERD/Peptic Ulcer)  Passing flatus: YES  Tolerating current diet: YES       Pain Management:   Patient states pain is manageable on current regimen: YES    Skin:  Christopher Score: 19  Interventions: float heels, increase time out of bed and PT/OT consult    Patient Safety:  Fall Score:  Total Score: 3  Interventions: bed/chair alarm, gripper socks and pt to call before getting OOB  High Fall Risk: Yes    Length of Stay:  Expected LOS: 4d 14h  Actual LOS: [address]

## 2021-10-12 NOTE — PROGRESS NOTES
Problem: Falls - Risk of  Goal: *Absence of Falls  Description: Document Karl Hanson Fall Risk and appropriate interventions in the flowsheet.   Outcome: Progressing Towards Goal  Note: Fall Risk Interventions:  Mobility Interventions: Bed/chair exit alarm, Communicate number of staff needed for ambulation/transfer, Patient to call before getting OOB         Medication Interventions: Bed/chair exit alarm, Patient to call before getting OOB, Teach patient to arise slowly    Elimination Interventions: Call light in reach, Patient to call for help with toileting needs    History of Falls Interventions: Bed/chair exit alarm, Investigate reason for fall         Problem: Patient Education: Go to Patient Education Activity  Goal: Patient/Family Education  Outcome: Progressing Towards Goal

## 2021-10-12 NOTE — PROGRESS NOTES
GI PROGRESS NOTE  Wan Walker, NP  [phone] NP in-hospital cell phone M-F until 4:30  After 5pm or on weekends, please call  for physician on call    Marty South Del-Benitez :1960 [id number]   ATTG: Dr Venice Proctor  PCP: None  Date/Time:  10/12/2021 1130 AM     Primary GI: Dr. Mikki Novoa - Dr Karl Ballard covering  Reason for following: Anemia, cirrhosis    Assessment:   Gastric Varices on CT and EGD - s/p BRTO with balloon occlusion and coil placement  Upper GI Bleed - Melena/ Hematemesis - bleeding resolved  WALTERS cirrhosis withThombocytopenia  - No abdominal pain, nausea, vomiting today  - No melena today  - Hgb 7.8 today, plt 85  BRTO 10/11/21 : Coil-assisted balloon occluded retrograde transvenous obliteration with 3% sodium tetradecyl sulfate. EGD 10/10/21 : -- isolated gastric varix (IGV1 or gastric fundal varix) with overlying clot along the greater curve of the stomach. No active bleeding identified, though this is almost certainly the source of bleeding and at elevated risk of rebleeding off -- no esophageal varices   CT :  Large gastric varices along the gastric fundus. No evidence of active GI bleed. Cirrhosis. Obesity, HTN, Hyperlipidemia      Plan:   - Stop octreotide gtt  - Continue ceftriaxone for full 7 day therapy  - Change CBC to daily  - Continue BID PPI  - Transfuse for Hgb 6-8  - Advance diet to GI lite. Will need outpatient follow up with primary GI in [place] upon discharge. Plan discussed with Dr Karl Ballard. Nothing further to add. Will sign off. Subjective:   Discussed with RN events overnight. Doing well today. NO pain. No stools. Eager to eat.      Complaint Y/N Description   Abdominal Pain n    Hematemesis n    Hematochezia n    Melena n  none today   Constipation n    Diarrhea n    Dyspepsia n    Dysphagia n    Jaundiced n    Nausea/vomiting n      Review of Systems:  Symptom Y/N Comments  Symptom Y/N Comments   Fever/Chills    Chest Pain     Cough Headaches     Sputum    Joint Pain     SOB/BERMAN    Pruritis/Rash     Tolerating Diet y clears  Other       Could NOT obtain due to:      Objective:   VITALS:   Last 24hrs VS reviewed since prior progress note. Most recent are:  Visit Vitals  /66 (BP 1 Location: Right upper arm, BP Patient Position: At rest)   Pulse (!) 57   Temp 98.8 °F (37.1 °C)   Resp 16   Ht 5' 6\" (1.676 m)   Wt 120.7 kg (266 lb)   SpO2 100%   BMI 42.93 kg/m²       Intake/Output Summary (Last 24 hours) at 10/12/2021 1055  Last data filed at 10/12/2021 0955  Gross per 24 hour   Intake 240 ml   Output 2200 ml   Net -1960 ml     PHYSICAL EXAM:  General: WD, obese. Alert, cooperative, no acute distress    HEENT: [place], Atraumatic. Anicteric sclerae. Lungs:  CTA Bilaterally. No Wheezing/Rhonchi/Rales. Heart:  Regular  rhythm,  No murmur, No Rubs, No Gallops  Abdomen: Soft, Non distended, Non tender.  +Bowel sounds, no HSM  Extremities: No c/c/e  Neurologic:  Alert and oriented X 3. No acute neurological distress   Psych:   Good insight. Not anxious nor agitated. Lab and Radiology Data Reviewed: (see below)    Medications Reviewed: (see below)  PMH/SH reviewed - no change compared to H&P  ________________________________________________________________________  Total time spent with patient: 30 minutes ________________________________________________________________________  Care Plan discussed with:  Patient y   Family  y - spouse and son at bedside   RN y - Aries Carlos RN              Consultant: Rafa Leroy NP     Procedures: see electronic medical records for all procedures/Xrays and details which were not copied into this note but were reviewed prior to creation of Plan. LABS:  Recent Labs     10/12/21  0557 10/11/21  8664 10/11/21  1516 10/11/21  0600   WBC 2.7*  --   --  3.0*   HGB 7.8* 7.1*   < > 6.9*   HCT 23.2* 21.1*   < > 21.1*   PLT 85*  --   --  78*    < > = values in this interval not displayed.      Recent Labs 10/12/21  0557 10/11/21  0600 10/09/21  1803    139 141   K 3.3* 3.5 3.3*    108 108   CO2 28 29 33*   BUN 11 15 42*   CREA 0.69* 0.83 1.12   * 199* 198*   CA 7.4* 7.6* 7.7*   MG  --   --  2.0   PHOS  --   --  2.6     No results for input(s): AP, TBIL, TP, ALB, GLOB, GGT, AML, LPSE in the last 72 hours. No lab exists for component: SGOT, GPT, AMYP, HLPSE  No results for input(s): INR, PTP, APTT, INREXT, INREXT in the last 72 hours. No results for input(s): FE, TIBC, PSAT, FERR in the last 72 hours. No results found for: FOL, RBCF  No results for input(s): PH, PCO2, PO2 in the last 72 hours. No results for input(s): CPK, CKMB in the last 72 hours.     No lab exists for component: TROPONINI  No results found for: COLOR, APPRN, SPGRU, REFSG, LAYO, PROTU, GLUCU, KETU, BILU, UROU, MANUEL, LEUKU, GLUKE, EPSU, BACTU, WBCU, RBCU, CASTS, UCRY    MEDICATIONS:  Current Facility-Administered Medications   Medication Dose Route Frequency    insulin lispro (HUMALOG) injection   SubCUTAneous AC&HS    sodium chloride (NS) flush 5-40 mL  5-40 mL IntraVENous Q8H    sodium chloride (NS) flush 5-40 mL  5-40 mL IntraVENous PRN    simethicone (MYLICON) 95LD/0.5KE oral drops 80 mg  1.2 mL Oral Multiple    octreotide (SANDOSTATIN) 500 mcg in 0.9% sodium chloride 500 mL infusion  50 mcg/hr IntraVENous CONTINUOUS    pantoprazole (PROTONIX) 40 mg in 0.9% sodium chloride 10 mL injection  40 mg IntraVENous Q12H    glucose chewable tablet 16 g  4 Tablet Oral PRN    dextrose (D50W) injection syrg 12.5-25 g  12.5-25 g IntraVENous PRN    glucagon (GLUCAGEN) injection 1 mg  1 mg IntraMUSCular PRN    acetaminophen (TYLENOL) tablet 325 mg  325 mg Oral Q8H PRN    ondansetron (ZOFRAN) injection 4 mg  4 mg IntraVENous Q4H PRN    cefTRIAXone (ROCEPHIN) 1 g in 0.9% sodium chloride (MBP/ADV) 50 mL MBP  1 g IntraVENous Q24H    0.9% sodium chloride infusion  50 mL/hr IntraVENous CONTINUOUS

## 2021-10-12 NOTE — PROGRESS NOTES
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  Continue care at stepdown unit due to high risk for deterioration  1 unit of PRBC was moderate but due to blood product shortage was not released by blood bank unless hemoglobin less than 7 or patient is actively bleeding  Transfused packed red blood cells on   Hemoglobin more than 7 today  Monitor H&H closely, keep hemoglobin above between 6 and 8 as per GI recommendations  PPI IV twice daily  Octreotide drip  GI following, status post EGD on October time with evidence of gastric varices and high risk stigmata  Advance diet as tolerated  Status post BRTO on   Stat labs were ordered  CTX x 7 days for prophylaxis   To follow-up with primary GI as an outpatient and consider transplant    CT: Large gastric varices along the gastric fundus. No evidence of active GI bleed. Cirrhosis.     DAYTON  Hypokalemia  DAYTON resolved  Replace potassium     DM type II  Cover with sliding scale as needed  Closely monitor for hypoglycemia     Hypertension  BP low side  Hold home medications     Hyperlipidemia  Continue statin on discharge     Thrombocytopenia  Mild, likely due to cirrhosis  Monitor closely       40 or above Morbid obesity / Body mass index is 42.93 kg/m². Estimated discharge date:   Barriers: clinical improvement    Code status: Full  Prophylaxis: SCD's  Recommended Disposition: Home w/Family     Subjective:     Patient was seen and examined. No acute events overnight. Discussed with RN overnight events. All patient's questions were answered. Patient's family at bedside, all questions were answered.     \"Feeling better today and I am hungry to eat\"    Review of Systems:  Symptom Y/N Comments  Symptom Y/N Comments   Fever/Chills n   Chest Pain n    Poor Appetite    Edema     Cough n Abdominal Pain n    Sputum    Joint Pain     SOB/BERMAN n   Pruritis/Rash     Nausea/vomit n   Tolerating PT/OT     Diarrhea    Tolerating Diet y  will start advancing diet   Constipation    Other       Could NOT obtain due to:          Objective:     VITALS:   Last 24hrs VS reviewed since prior progress note. Most recent are:  Patient Vitals for the past 24 hrs:   Temp Pulse Resp BP SpO2   10/12/21 1051 98.8 °F (37.1 °C) (!) 57 16 120/66 100 %   10/12/21 0851     100 %   10/12/21 0735 98 °F (36.7 °C) (!) 57 14 (!) 118/58 100 %   10/12/21 0400 98.6 °F (37 °C) (!) 53 12 102/66 100 %   10/12/21 0012 98.2 °F (36.8 °C) (!) 57 13 113/63 99 %   10/11/21 2300 98.6 °F (37 °C) (!) 59 13 101/64 96 %   10/11/21 1950 98 °F (36.7 °C) (!) 59 14 108/68 96 %   10/11/21 1700  (!) 57 12 105/68 91 %   10/11/21 1630  61 15 107/65 97 %   10/11/21 1600  62 15 107/61 96 %   10/11/21 1530  63 18 97/63 97 %   10/11/21 1500  (!) 59 18 105/66 98 %   10/11/21 1455 97.7 °F (36.5 °C) 60 18 97/66 97 %   10/11/21 1430  62 14 95/63 94 %   10/11/21 1414  63 14 92/63 94 %   10/11/21 1400  63 14 93/60 94 %   10/11/21 1340  (!) 52 14 (!) 95/56 94 %   10/11/21 1335  (!) 55 12 95/64 99 %   10/11/21 1330  (!) 57 12 95/63 99 %   10/11/21 1325  60 17 110/63 98 %   10/11/21 1320  62 15 94/61 96 %   10/11/21 1315  63 16 108/67 97 %   10/11/21 1310  65 16 106/64 96 %   10/11/21 1305  62 12 105/66 99 %       Intake/Output Summary (Last 24 hours) at 10/12/2021 1304  Last data filed at 10/12/2021 0955  Gross per 24 hour   Intake 240 ml   Output 2200 ml   Net -1960 ml        I had a face to face encounter and independently examined this patient on 10/12/2021, as outlined below:  PHYSICAL EXAM:  General: WD, WN. Alert, cooperative, no acute distress    EENT:  EOMI. Anicteric sclerae. MMM  Resp:  CTA bilaterally, no wheezing or rales. No accessory muscle use  CV:  Regular  rhythm,  No edema  GI:  Soft, Non distended, Non tender.   +Bowel sounds  Neurologic:  Alert and oriented X 3, normal speech,   Psych:   Good insight. Not anxious nor agitated  Skin:  No rashes. No jaundice    Reviewed most current lab test results and cultures  YES  Reviewed most current radiology test results   YES  Review and summation of old records today    NO  Reviewed patient's current orders and MAR    YES  PMH/SH reviewed - no change compared to H&P  ________________________________________________________________________  Care Plan discussed with:    Comments   Patient x    Family  X    RN x    Care Manager     Consultant  X  GI                     Multidiciplinary team rounds were held today with , nursing, pharmacist and clinical coordinator. Patient's plan of care was discussed; medications were reviewed and discharge planning was addressed. ________________________________________________________________________  Total NON critical care TIME:  35  Minutes    Total CRITICAL CARE TIME Spent:   Minutes non procedure based      Comments   >50% of visit spent in counseling and coordination of care     ________________________________________________________________________  Christine Potter MD     Procedures: see electronic medical records for all procedures/Xrays and details which were not copied into this note but were reviewed prior to creation of Plan. LABS:  I reviewed today's most current labs and imaging studies. Pertinent labs include:  Recent Labs     10/12/21  0557 10/11/21  2344 10/11/21  2216 10/11/21  1516 10/11/21  0600 10/10/21  0343 10/09/21  1803   WBC 2.7*  --   --   --  3.0*  --  4.3   HGB 7.8* 7.1* 6.2*   < > 6.9*   < > 6.3*   HCT 23.2* 21.1* 20.7*   < > 21.1*   < > 18.6*   PLT 85*  --   --   --  78*  --  85*    < > = values in this interval not displayed.      Recent Labs     10/12/21  0557 10/11/21  0600 10/09/21  1803    139 141   K 3.3* 3.5 3.3*    108 108   CO2 28 29 33*   * 199* 198*   BUN 11 15 42*   CREA 0.69* 0.83 1.12   CA 7.4* 7.6* 7.7*   MG  --   --  2.0   PHOS  --   --  2.6       Signed: Alfredo Govea MD

## 2021-10-12 NOTE — PROGRESS NOTES
Problem: Falls - Risk of  Goal: *Absence of Falls  Description: Document Jose Carlos Parkinson Fall Risk and appropriate interventions in the flowsheet.   Outcome: Progressing Towards Goal  Note: Fall Risk Interventions:  Mobility Interventions: Bed/chair exit alarm, Patient to call before getting OOB         Medication Interventions: Bed/chair exit alarm, Patient to call before getting OOB, Teach patient to arise slowly    Elimination Interventions: Bed/chair exit alarm, Urinal in reach, Call light in reach    History of Falls Interventions: Bed/chair exit alarm, Vital signs minimum Q4HRs X 24 hrs (comment for end date)         Problem: Patient Education: Go to Patient Education Activity  Goal: Patient/Family Education  Outcome: Progressing Towards Goal

## 2021-10-13 LAB
ANION GAP SERPL CALC-SCNC: 3 MMOL/L (ref 5–15)
BUN SERPL-MCNC: 11 MG/DL (ref 6–20)
BUN/CREAT SERPL: 15 (ref 12–20)
CALCIUM SERPL-MCNC: 7.4 MG/DL (ref 8.5–10.1)
CHLORIDE SERPL-SCNC: 106 MMOL/L (ref 97–108)
CO2 SERPL-SCNC: 29 MMOL/L (ref 21–32)
CREAT SERPL-MCNC: 0.74 MG/DL (ref 0.7–1.3)
ERYTHROCYTE [DISTWIDTH] IN BLOOD BY AUTOMATED COUNT: 16.6 % (ref 11.5–14.5)
ERYTHROCYTE [DISTWIDTH] IN BLOOD BY AUTOMATED COUNT: 16.6 % (ref 11.5–14.5)
GLUCOSE BLD STRIP.AUTO-MCNC: 184 MG/DL (ref 65–117)
GLUCOSE BLD STRIP.AUTO-MCNC: 190 MG/DL (ref 65–117)
GLUCOSE BLD STRIP.AUTO-MCNC: 211 MG/DL (ref 65–117)
GLUCOSE BLD STRIP.AUTO-MCNC: 256 MG/DL (ref 65–117)
GLUCOSE SERPL-MCNC: 170 MG/DL (ref 65–100)
HCT VFR BLD AUTO: 22.8 % (ref 36.6–50.3)
HCT VFR BLD AUTO: 23.9 % (ref 36.6–50.3)
HGB BLD-MCNC: 7.6 G/DL (ref 12.1–17)
HGB BLD-MCNC: 8.1 G/DL (ref 12.1–17)
MAGNESIUM SERPL-MCNC: 2 MG/DL (ref 1.6–2.4)
MCH RBC QN AUTO: 29.3 PG (ref 26–34)
MCH RBC QN AUTO: 29.6 PG (ref 26–34)
MCHC RBC AUTO-ENTMCNC: 33.3 G/DL (ref 30–36.5)
MCHC RBC AUTO-ENTMCNC: 33.9 G/DL (ref 30–36.5)
MCV RBC AUTO: 87.2 FL (ref 80–99)
MCV RBC AUTO: 88 FL (ref 80–99)
NRBC # BLD: 0.05 K/UL (ref 0–0.01)
NRBC # BLD: 0.05 K/UL (ref 0–0.01)
NRBC BLD-RTO: 2 PER 100 WBC
NRBC BLD-RTO: 2.1 PER 100 WBC
PLATELET # BLD AUTO: 84 K/UL (ref 150–400)
PLATELET # BLD AUTO: 88 K/UL (ref 150–400)
PMV BLD AUTO: 11 FL (ref 8.9–12.9)
PMV BLD AUTO: 11.1 FL (ref 8.9–12.9)
POTASSIUM SERPL-SCNC: 3.1 MMOL/L (ref 3.5–5.1)
POTASSIUM SERPL-SCNC: 3.1 MMOL/L (ref 3.5–5.1)
RBC # BLD AUTO: 2.59 M/UL (ref 4.1–5.7)
RBC # BLD AUTO: 2.74 M/UL (ref 4.1–5.7)
SERVICE CMNT-IMP: ABNORMAL
SODIUM SERPL-SCNC: 138 MMOL/L (ref 136–145)
WBC # BLD AUTO: 2.3 K/UL (ref 4.1–11.1)
WBC # BLD AUTO: 2.4 K/UL (ref 4.1–11.1)

## 2021-10-13 PROCEDURE — C9113 INJ PANTOPRAZOLE SODIUM, VIA: HCPCS | Performed by: HOSPITALIST

## 2021-10-13 PROCEDURE — 74011250636 HC RX REV CODE- 250/636: Performed by: INTERNAL MEDICINE

## 2021-10-13 PROCEDURE — 80048 BASIC METABOLIC PNL TOTAL CA: CPT

## 2021-10-13 PROCEDURE — 82962 GLUCOSE BLOOD TEST: CPT

## 2021-10-13 PROCEDURE — 74011250636 HC RX REV CODE- 250/636: Performed by: HOSPITALIST

## 2021-10-13 PROCEDURE — 65660000000 HC RM CCU STEPDOWN

## 2021-10-13 PROCEDURE — 83735 ASSAY OF MAGNESIUM: CPT

## 2021-10-13 PROCEDURE — 85027 COMPLETE CBC AUTOMATED: CPT

## 2021-10-13 PROCEDURE — 74011000250 HC RX REV CODE- 250: Performed by: HOSPITALIST

## 2021-10-13 PROCEDURE — 84132 ASSAY OF SERUM POTASSIUM: CPT

## 2021-10-13 PROCEDURE — 74011250637 HC RX REV CODE- 250/637: Performed by: NURSE PRACTITIONER

## 2021-10-13 PROCEDURE — 74011250637 HC RX REV CODE- 250/637: Performed by: INTERNAL MEDICINE

## 2021-10-13 PROCEDURE — 36415 COLL VENOUS BLD VENIPUNCTURE: CPT

## 2021-10-13 PROCEDURE — 74011636637 HC RX REV CODE- 636/637: Performed by: INTERNAL MEDICINE

## 2021-10-13 PROCEDURE — 74011000258 HC RX REV CODE- 258: Performed by: INTERNAL MEDICINE

## 2021-10-13 RX ORDER — MAG HYDROX/ALUMINUM HYD/SIMETH 200-200-20
30 SUSPENSION, ORAL (FINAL DOSE FORM) ORAL
Status: DISCONTINUED | OUTPATIENT
Start: 2021-10-13 | End: 2021-10-14 | Stop reason: HOSPADM

## 2021-10-13 RX ADMIN — Medication 10 ML: at 22:27

## 2021-10-13 RX ADMIN — INSULIN LISPRO 3 UNITS: 100 INJECTION, SOLUTION INTRAVENOUS; SUBCUTANEOUS at 12:18

## 2021-10-13 RX ADMIN — SODIUM CHLORIDE 40 MG: 9 INJECTION INTRAMUSCULAR; INTRAVENOUS; SUBCUTANEOUS at 08:18

## 2021-10-13 RX ADMIN — SODIUM CHLORIDE 40 MG: 9 INJECTION INTRAMUSCULAR; INTRAVENOUS; SUBCUTANEOUS at 22:27

## 2021-10-13 RX ADMIN — Medication 10 ML: at 14:49

## 2021-10-13 RX ADMIN — POTASSIUM BICARBONATE 40 MEQ: 782 TABLET, EFFERVESCENT ORAL at 09:09

## 2021-10-13 RX ADMIN — INSULIN LISPRO 1 UNITS: 100 INJECTION, SOLUTION INTRAVENOUS; SUBCUTANEOUS at 22:27

## 2021-10-13 RX ADMIN — Medication 10 ML: at 06:37

## 2021-10-13 RX ADMIN — ALUMINUM HYDROXIDE, MAGNESIUM HYDROXIDE, AND SIMETHICONE 30 ML: 200; 200; 20 SUSPENSION ORAL at 00:13

## 2021-10-13 RX ADMIN — CEFTRIAXONE 1 G: 1 INJECTION, POWDER, FOR SOLUTION INTRAMUSCULAR; INTRAVENOUS at 22:27

## 2021-10-13 NOTE — PROGRESS NOTES
Transition of Care Plan:     RUR: 13%  Disposition: Home with spouse  Follow up appointments: PCP, GI?  DME needed: None anticipated  Transportation at Discharge: Pt's spouse  Keys or means to access home:  Pt has access      IM Medicare Letter: N/A - commercial insurance   Is patient a BCPI-A Bundle:  N/A                If yes, was Bundle Letter given?:     Caregiver Contact: Pt's spouse, Romulo Melendez Del-Benitez p) [phone]  Discharge Caregiver contacted prior to discharge?  CM spoke to pt and pt's spouse      Pt has the potential of being d/c home today depending on his potassium level. CM has schedule a hospital follow up appointment with pt's PCP for Tuesday Oct. 19 at 10:20 am.     CM left a message with the pt's GI doctor to schedule a hospital follow up appointment. CM will continue to follow and assist with d/c planning. Karolina Le.Spray.   Care Manager [facility]  [phone]

## 2021-10-13 NOTE — PROGRESS NOTES
Bedside shift change report given to Alisa Tubbs RN (oncoming nurse) by  Nelson Odell nurse). Report included the following information SBAR, Kardex, Intake/Output, MAR and Cardiac Rhythm NSR. Patient's potassium on AM labs 3.1 MD ordered EFFER-K and asked to redraw labs at 1300. Potassium redrawn, potassium remains 3.1 notified MD    [id number]: Patient asked nurse to observe stool. Stool black/marroon with red streaks, and states his stomach is hurting 2/10 notified MD. MD states to contact GI. Reached out to GI, Dr. Rachel Sousa should see patient. End of Shift Note    Bedside shift change report given to Tayler Swanson RN (oncoming nurse) by Melania Valdes RN (offgoing nurse). Report included the following information SBAR, Kardex, Intake/Output, MAR and Cardiac Rhythm NSR    Shift worked:  7a-7p     Shift summary and any significant changes:     bloody stool today, Gastro assess patient states they expect bloody stools for a few days and ok with DC if VSS     Concerns for physician to address:  none     Zone phone for oncoming shift:          Activity:  Activity Level: Up with Assistance  Number times ambulated in hallways past shift: 0  Number of times OOB to chair past shift: 0    Cardiac:   Cardiac Monitoring: Yes      Cardiac Rhythm: Sinus Kevin    Access:   Current line(s): PIV     Genitourinary:   Urinary status: voiding    Respiratory:   O2 Device: None (Room air)  Chronic home O2 use?: NO  Incentive spirometer at bedside: N/A     GI:  Last Bowel Movement Date: 10/09/21  Current diet:  DIET ONE TIME MESSAGE  ADULT DIET Regular; GI Graves (GERD/Peptic Ulcer)  Passing flatus: YES  Tolerating current diet: YES       Pain Management:   Patient states pain is manageable on current regimen: YES    Skin:  Christopher Score: 20  Interventions: limit briefs    Patient Safety:  Fall Score:  Total Score: 2  Interventions: bed/chair alarm  High Fall Risk: Yes    Length of Stay:  Expected LOS: 4d 14h  Actual LOS: [address], RN

## 2021-10-13 NOTE — PROGRESS NOTES
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  Continue care at stepdown unit due to high risk for deterioration  1 unit of PRBC was moderate but due to blood product shortage was not released by blood bank unless hemoglobin less than 7 or patient is actively bleeding  Transfused packed red blood cells on   Hemoglobin more than 7 today  Monitor H&H closely, keep hemoglobin above between 6 and 8 as per GI recommendations  PPI IV twice daily  Octreotide drip  GI following, status post EGD on October time with evidence of gastric varices and high risk stigmata  Advance diet as tolerated, he had episode of abdominal pain on large bloody bowel movement today  Status post BRTO on   Stat labs were ordered  CTX x 7 days for prophylaxis   To follow-up with primary GI as an outpatient and consider transplant  Patient reported some mild abdominal pain after he ate today    CT: Large gastric varices along the gastric fundus. No evidence of active GI bleed. Cirrhosis.     DAYTON  Hypokalemia  DAYTON resolved  Replace potassium, hypokalemia persistent, check magnesium level     DM type II  Cover with sliding scale as needed  Closely monitor for hypoglycemia     Hypertension  BP low side  Hold home medications     Hyperlipidemia  Continue statin on discharge     Thrombocytopenia  Mild, likely due to cirrhosis  Monitor closely       40 or above Morbid obesity / Body mass index is 42.93 kg/m². Estimated discharge date:   Barriers: clinical improvement    Code status: Full  Prophylaxis: SCD's  Recommended Disposition: Home w/Family     Subjective:     Patient was seen and examined. No acute events overnight. Discussed with RN overnight events. All patient's questions were answered. Patient's family at bedside, all questions were answered.     \"Doing fine\"    Review of Systems:  Symptom Y/N Comments  Symptom Y/N Comments   Fever/Chills n   Chest Pain n    Poor Appetite    Edema     Cough n   Abdominal Pain y    Sputum    Joint Pain     SOB/BERMAN n   Pruritis/Rash     Nausea/vomit n   Tolerating PT/OT     Diarrhea    Tolerating Diet y     Constipation    Other       Could NOT obtain due to:          Objective:     VITALS:   Last 24hrs VS reviewed since prior progress note. Most recent are:  Patient Vitals for the past 24 hrs:   Temp Pulse Resp BP SpO2   10/13/21 1100 98.8 °F (37.1 °C) 70 20 105/63 98 %   10/13/21 0741 98.7 °F (37.1 °C) (!) 58 14 115/60 99 %   10/13/21 0344 98.8 °F (37.1 °C) (!) 59 17 104/63 98 %   10/12/21 2328 98.7 °F (37.1 °C) 62 18 108/60 100 %   10/12/21 2000  70 19  98 %   10/12/21 1931 99.1 °F (37.3 °C) 62 18 116/64 98 %   10/12/21 1550 98.4 °F (36.9 °C) 66 19 134/77 97 %       Intake/Output Summary (Last 24 hours) at 10/13/2021 1338  Last data filed at 10/13/2021 0344  Gross per 24 hour   Intake 4821.67 ml   Output 1050 ml   Net 3771.67 ml        I had a face to face encounter and independently examined this patient on 10/13/2021, as outlined below:  PHYSICAL EXAM:  General: WD, WN. Alert, cooperative, no acute distress    EENT:  EOMI. Anicteric sclerae. MMM  Resp:  CTA bilaterally, no wheezing or rales. No accessory muscle use  CV:  Regular  rhythm,  No edema  GI:  Soft, Non distended, Non tender. +Bowel sounds  Neurologic:  Alert and oriented X 3, normal speech,   Psych:   Good insight. Not anxious nor agitated  Skin:  No rashes.   No jaundice    Reviewed most current lab test results and cultures  YES  Reviewed most current radiology test results   YES  Review and summation of old records today    NO  Reviewed patient's current orders and MAR    YES  PMH/SH reviewed - no change compared to H&P  ________________________________________________________________________  Care Plan discussed with:    Comments   Patient x    Family  X    RN x    Care Manager     Consultant  X  GI                     Multidiciplinary team rounds were held today with , nursing, pharmacist and clinical coordinator. Patient's plan of care was discussed; medications were reviewed and discharge planning was addressed. ________________________________________________________________________  Total NON critical care TIME:  35  Minutes    Total CRITICAL CARE TIME Spent:   Minutes non procedure based      Comments   >50% of visit spent in counseling and coordination of care     ________________________________________________________________________  Albert Pierre MD     Procedures: see electronic medical records for all procedures/Xrays and details which were not copied into this note but were reviewed prior to creation of Plan. LABS:  I reviewed today's most current labs and imaging studies. Pertinent labs include:  Recent Labs     10/13/21  1217 10/13/21  0332 10/12/21  0557   WBC 2.4* 2.3* 2.7*   HGB 8.1* 7.6* 7.8*   HCT 23.9* 22.8* 23.2*   PLT 88* 84* 85*     Recent Labs     10/13/21  1217 10/13/21  0332 10/12/21  0557 10/11/21  0600 10/11/21  0600   NA  --  138 137  --  139   K 3.1* 3.1* 3.3*   < > 3.5   CL  --  106 106  --  108   CO2  --  29 28  --  29   GLU  --  170* 156*  --  199*   BUN  --  11 11  --  15   CREA  --  0.74 0.69*  --  0.83   CA  --  7.4* 7.4*  --  7.6*    < > = values in this interval not displayed.        Signed: Albert Pierre MD

## 2021-10-13 NOTE — PROGRESS NOTES
GI PROGRESS NOTE  Joyce Recinos, NP  [phone] NP in-hospital cell phone M-F until 4:30  After 5pm or on weekends, please call  for physician on call    Cristi [place] Del-Benitez :1960 DRI:[id number]   ATTG: Dr Hilda Srinivasan  PCP: None  Date/Time:  10/13/2021 1305 PM     Primary GI: Dr. Anderson Saundra - Dr Katelyn Poole covering  Reason for following: Anemia, cirrhosis    Assessment:   Gastric Varices on CT and EGD - s/p BRTO with balloon occlusion and coil placement  Upper GI Bleed - Melena/ Hematemesis - bleeding resolved  WALTERS cirrhosis withThombocytopenia  - No abdominal pain, nausea, vomiting today  - Large BM today with black, maroon, and some red tinged water  - Hgb 7.6 today - repeat after BM 8.1 - Stable  BRTO 10/11/21 : Coil-assisted balloon occluded retrograde transvenous obliteration with 3% sodium tetradecyl sulfate. EGD 10/10/21 : -- isolated gastric varix (IGV1 or gastric fundal varix) with overlying clot along the greater curve of the stomach. No active bleeding identified, though this is almost certainly the source of bleeding and at elevated risk of rebleeding off -- no esophageal varices   CT :  Large gastric varices along the gastric fundus. No evidence of active GI bleed. Cirrhosis. Obesity, HTN, Hyperlipidemia      Plan:   - No further evaluation needed for bleeding - pt's HR, BP, and hgb is stable s/p bloody stool, likely old blood. - Continue ceftriaxone for full 7 day therapy  - Follow CBC  - Continue BID PPI  - Transfuse for Hgb 6-8  - Diet as tolerated. Will need outpatient follow up with primary GI in [place] upon discharge. Plan discussed with Dr Katelyn Poole and Dr Hilda Srinivasan. Nothing further to add. Will sign off. Subjective:   Discussed with RN events overnight. Doing well today and felt worsening abdominal pain prior to having a BM but had a very large stool and pain resolved in LLQ and LUQ. Now eating without any nausea vomiting.      Complaint Y/N Description Abdominal Pain n    Hematemesis n    Hematochezia     Melena     Constipation n    Diarrhea n    Dyspepsia n    Dysphagia n    Jaundiced n    Nausea/vomiting n      Review of Systems:  Symptom Y/N Comments  Symptom Y/N Comments   Fever/Chills    Chest Pain     Cough    Headaches     Sputum    Joint Pain     SOB/BERMAN    Pruritis/Rash     Tolerating Diet y reg  Other       Could NOT obtain due to:      Objective:   VITALS:   Last 24hrs VS reviewed since prior progress note. Most recent are:  Visit Vitals  /63   Pulse 70   Temp 98.8 °F (37.1 °C)   Resp 20   Ht 5' 6\" (1.676 m)   Wt 120.7 kg (266 lb)   SpO2 98%   BMI 42.93 kg/m²       Intake/Output Summary (Last 24 hours) at 10/13/2021 1305  Last data filed at 10/13/2021 0344  Gross per 24 hour   Intake 4821.67 ml   Output 1050 ml   Net 3771.67 ml     PHYSICAL EXAM:  General: WD, obese. Alert, cooperative, no acute distress    HEENT: NC, Atraumatic. Anicteric sclerae. Lungs:  CTA Bilaterally. No Wheezing/Rhonchi/Rales. Heart:  Regular  rhythm,  No murmur, No Rubs, No Gallops  Abdomen: Soft, Non distended, Non tender.  +Bowel sounds, no HSM  Extremities: No c/c/e  Neurologic:  Alert and oriented X 3. No acute neurological distress   Psych:   Good insight. Not anxious nor agitated. Lab and Radiology Data Reviewed: (see below)    Medications Reviewed: (see below)  PMH/SH reviewed - no change compared to H&P  ________________________________________________________________________  Total time spent with patient: 30 minutes ________________________________________________________________________  Care Plan discussed with:  Patient y   Family  No family atbedside   RN brian Page               Consultant:  Dr Cl Vega, NP     Procedures: see electronic medical records for all procedures/Xrays and details which were not copied into this note but were reviewed prior to creation of Plan.       LABS:  Recent Labs     10/13/21  1217 10/13/21  0332   WBC 2. 4* 2.3*   HGB 8.1* 7.6*   HCT 23.9* 22.8*   PLT 88* 84*     Recent Labs     10/13/21  1217 10/13/21  0332 10/12/21  0557 10/11/21  0600 10/11/21  0600   NA  --  138 137  --  139   K 3.1* 3.1* 3.3*   < > 3.5   CL  --  106 106  --  108   CO2  --  29 28  --  29   BUN  --  11 11  --  15   CREA  --  0.74 0.69*  --  0.83   GLU  --  170* 156*  --  199*   CA  --  7.4* 7.4*  --  7.6*    < > = values in this interval not displayed. No results for input(s): AP, TBIL, TP, ALB, GLOB, GGT, AML, LPSE in the last 72 hours. No lab exists for component: SGOT, GPT, AMYP, HLPSE  No results for input(s): INR, PTP, APTT, INREXT, INREXT in the last 72 hours. No results for input(s): FE, TIBC, PSAT, FERR in the last 72 hours. No results found for: FOL, RBCF  No results for input(s): PH, PCO2, PO2 in the last 72 hours. No results for input(s): CPK, CKMB in the last 72 hours.     No lab exists for component: TROPONINI  No results found for: COLOR, APPRN, SPGRU, REFSG, LAYO, PROTU, GLUCU, KETU, BILU, UROU, MANUEL, LEUKU, GLUKE, EPSU, BACTU, WBCU, RBCU, CASTS, UCRY    MEDICATIONS:  Current Facility-Administered Medications   Medication Dose Route Frequency    alum-mag hydroxide-simeth (MYLANTA) oral suspension 30 mL  30 mL Oral Q4H PRN    insulin lispro (HUMALOG) injection   SubCUTAneous AC&HS    sodium chloride (NS) flush 5-40 mL  5-40 mL IntraVENous Q8H    sodium chloride (NS) flush 5-40 mL  5-40 mL IntraVENous PRN    simethicone (MYLICON) 04KP/6.1IP oral drops 80 mg  1.2 mL Oral Multiple    pantoprazole (PROTONIX) 40 mg in 0.9% sodium chloride 10 mL injection  40 mg IntraVENous Q12H    glucose chewable tablet 16 g  4 Tablet Oral PRN    dextrose (D50W) injection syrg 12.5-25 g  12.5-25 g IntraVENous PRN    glucagon (GLUCAGEN) injection 1 mg  1 mg IntraMUSCular PRN    acetaminophen (TYLENOL) tablet 325 mg  325 mg Oral Q8H PRN    ondansetron (ZOFRAN) injection 4 mg  4 mg IntraVENous Q4H PRN    cefTRIAXone (ROCEPHIN) 1 g in 0.9% sodium chloride (MBP/ADV) 50 mL MBP  1 g IntraVENous Q24H

## 2021-10-13 NOTE — PROGRESS NOTES
Received notification from bedside RN about patient with regards to: abdominal distention and discomfort, requesting medication for relief  VS: /60, HR 62, RR 18, O2 sat 100% on RA     Intervention given: Mylanta PO PRN ordered

## 2021-10-14 VITALS
OXYGEN SATURATION: 98 % | HEART RATE: 76 BPM | TEMPERATURE: 98.2 F | RESPIRATION RATE: 16 BRPM | DIASTOLIC BLOOD PRESSURE: 72 MMHG | HEIGHT: 66 IN | WEIGHT: 266 LBS | BODY MASS INDEX: 42.75 KG/M2 | SYSTOLIC BLOOD PRESSURE: 113 MMHG

## 2021-10-14 LAB
ERYTHROCYTE [DISTWIDTH] IN BLOOD BY AUTOMATED COUNT: 16 % (ref 11.5–14.5)
GLUCOSE BLD STRIP.AUTO-MCNC: 167 MG/DL (ref 65–117)
HCT VFR BLD AUTO: 23.2 % (ref 36.6–50.3)
HGB BLD-MCNC: 7.7 G/DL (ref 12.1–17)
MCH RBC QN AUTO: 28.7 PG (ref 26–34)
MCHC RBC AUTO-ENTMCNC: 33.2 G/DL (ref 30–36.5)
MCV RBC AUTO: 86.6 FL (ref 80–99)
NRBC # BLD: 0.03 K/UL (ref 0–0.01)
NRBC BLD-RTO: 1.3 PER 100 WBC
PLATELET # BLD AUTO: 89 K/UL (ref 150–400)
PMV BLD AUTO: 11 FL (ref 8.9–12.9)
RBC # BLD AUTO: 2.68 M/UL (ref 4.1–5.7)
SERVICE CMNT-IMP: ABNORMAL
WBC # BLD AUTO: 2.3 K/UL (ref 4.1–11.1)

## 2021-10-14 PROCEDURE — 36415 COLL VENOUS BLD VENIPUNCTURE: CPT

## 2021-10-14 PROCEDURE — 82962 GLUCOSE BLOOD TEST: CPT

## 2021-10-14 PROCEDURE — 74011000250 HC RX REV CODE- 250: Performed by: HOSPITALIST

## 2021-10-14 PROCEDURE — C9113 INJ PANTOPRAZOLE SODIUM, VIA: HCPCS | Performed by: HOSPITALIST

## 2021-10-14 PROCEDURE — 85027 COMPLETE CBC AUTOMATED: CPT

## 2021-10-14 PROCEDURE — 74011250636 HC RX REV CODE- 250/636: Performed by: HOSPITALIST

## 2021-10-14 RX ORDER — PANTOPRAZOLE SODIUM 40 MG/1
40 TABLET, DELAYED RELEASE ORAL 2 TIMES DAILY
Qty: 60 TABLET | Refills: 0 | Status: SHIPPED | OUTPATIENT
Start: 2021-10-14 | End: 2021-11-13

## 2021-10-14 RX ADMIN — SODIUM CHLORIDE 40 MG: 9 INJECTION INTRAMUSCULAR; INTRAVENOUS; SUBCUTANEOUS at 08:28

## 2021-10-14 NOTE — DISCHARGE SUMMARY
Hospitalist Discharge Summary     Patient ID:  Chuy Chandler Del-Benitez  [id number]  59 y.o.  1960  10/8/2021    PCP on record: None    Admit date: 10/8/2021  Discharge date and time: 10/14/2021    DISCHARGE DIAGNOSIS:    UGI bleeding due to Gastric varices POA- s/p EGD followed by  JASMIN on October 11, Cont Porotonix BID for now as recommended, S/p PRBC, S/p rocephin x 7 doses, Outpatient Close followup with Primary GI/hepatolog in [facility] recommended. in settings of Cirrhosis of liver due to WALTERS with ascites POA  DAYTON- resolved  Hypokalemia- replenished  DM  Hyperlipidemia  Chronic Thrombocytopenia  Full code    CONSULTATIONS:  IP CONSULT TO GASTROENTEROLOGY  IP CONSULT TO GASTROENTEROLOGY  IP CONSULT TO HOSPITALIST    Excerpted HPI from H&P of Marshall Toth MD:  [age] y.o.  male who presents with above complaints. Patient with known history of liver cirrhosis and being followed up by GI outpatient. While at work today he is started vomiting dark red blood. Per EMS patient was pale, diaphoretic, hypotensive with blood pressure in mid 70s. He was given IV fluids by EMS. Blood pressure improved while he is in ED. No more episodes of vomiting blood. ED provider discussed case with GI on-call who recommended to start patient on PPI and octreotide drip. Patient denies any dyspnea, chest pain, dizziness. He denies any blood in stool recently or dark stools. He admits to generalized abdominal pain since yesterday. He was not feeling well since yesterday. No fever or chills at home.     We were asked to admit for work up and evaluation of the above problems. \"    ______________________________________________________________________  DISCHARGE SUMMARY/HOSPITAL COURSE:  for full details see H&P, daily progress notes, labs, consult notes.      UGI bleeding in settings of Cirrhosis of liver due to WALTERS with ascites  Continue care at stepdown unit due to high risk for deterioration  1 unit of PRBC was moderate but due to blood product shortage was not released by blood bank unless hemoglobin less than 7 or patient is actively bleeding  Transfused packed red blood cells on October 9  Hemoglobin more than 7 today  Monitor H&H closely, keep hemoglobin above between 6 and 8 as per GI recommendations  PPI IV twice daily  Octreotide drip  GI following, status post EGD on October 9 with evidence of gastric varices and high risk stigmata  Advance diet as tolerated, he had episode of abdominal pain on large bloody bowel movement today  Status post BRTO on October 11  Stat labs were ordered  CTX x 7 days for prophylaxis   To follow-up with primary GI as an outpatient and consider transplant  Patient reported some mild abdominal pain after he ate today     CT: Large gastric varices along the gastric fundus. No evidence of active GI bleed. Cirrhosis.     DAYTON  Hypokalemia  DAYTON resolved  Replace potassium, hypokalemia persistent, check magnesium level     DM type II  Cover with sliding scale as needed  Closely monitor for hypoglycemia     Hypertension  BP low side  Hold home medications     Hyperlipidemia  Continue statin on discharge     Thrombocytopenia  Mild, likely due to cirrhosis  Monitor closely        40 or above Morbid obesity / Body mass index is 42.93 kg/m².     Estimated discharge date: October 14  Barriers: clinical improvement     Code status: Full        _______________________________________________________________________  Patient seen and examined by me on discharge day. Pertinent Findings:  Gen:    Not in distress  Chest: Clear lungs  CVS:   Regular rhythm.   No edema  Abd:  Soft, not distended, not tender  Neuro:  Alert, oriented x 3  _______________________________________________________________________  DISCHARGE MEDICATIONS:   Current Discharge Medication List      START taking these medications    Details   pantoprazole (Protonix) 40 mg tablet Take 1 Tablet by mouth two (2) times a day for 30 days. Qty: 60 Tablet, Refills: 0  Start date: 10/14/2021, End date: 11/13/2021         CONTINUE these medications which have NOT CHANGED    Details   metFORMIN (GLUCOPHAGE) 1,000 mg tablet Take 1,000 mg by mouth two (2) times daily (with meals). amLODIPine (NORVASC) 10 mg tablet Take 10 mg by mouth daily. atorvastatin (LIPITOR) 40 mg tablet Take 40 mg by mouth daily. empagliflozin (Jardiance) 10 mg tablet Take 10 mg by mouth daily. metoclopramide HCl (Reglan) 5 mg tablet Take 5 mg by mouth nightly. omeprazole (PRILOSEC) 40 mg capsule Take 40 mg by mouth daily. pioglitazone (ACTOS) 45 mg tablet Take 45 mg by mouth daily. torsemide (DEMADEX) 20 mg tablet Take 20 mg by mouth daily. bismuth subsalicylate (PEPTO-BISMOL) 262 mg chew Take 2 Tablets by mouth every three (3) hours as needed for Nausea or Diarrhea. betamethasone dipropionate (DIPROSONE) 0.05 % topical cream Apply 1 Each to affected area two (2) times daily as needed for Skin Irritation or Itching. STOP taking these medications       aspirin delayed-release 81 mg tablet Comments:   Reason for Stopping:                 Patient Follow Up Instructions:    Activity: Activity as tolerated  Diet: Cardiac Diet, Diabetic Diet and Low fat, Low cholesterol      Follow-up with Your PCP on 10/19 10:20 AM as arranged to get referral to Hepatology/GI as recommended to have close followup on WATLERS/Liver cirrhosis in [place], [place]      Follow-up Information     Follow up With Specialties Details Why Contact Info    Sheron James  On 10/19/2021 A hospital follow up appointment at 10:20 am [address] [facility]  [address]  [facility], [address] [facility]  [address] [facility] [address]  On 10/28/2021 A hospital follow up appointment at 2:45 pm [address]  [address]  [phone]      On 10/25/2021 Ultrasound schedule at the hosptial at 8:30 am     None    None (395) Patient stated that they have no PCP          ________________________________________________________________    Risk of deterioration: Low    Condition at Discharge:  Stable  __________________________________________________________________    Disposition  Home with family, no needs    ____________________________________________________________________    Code Status: Full Code  ___________________________________________________________________      Total time in minutes spent coordinating this discharge (includes going over instructions, follow-up, prescriptions, and preparing report for sign off to her PCP) :  >30 minutes    Signed:  Tariq Hawkins MD

## 2021-10-14 NOTE — PROGRESS NOTES
Problem: Falls - Risk of  Goal: *Absence of Falls  Description: Document Lexi Cleveland Fall Risk and appropriate interventions in the flowsheet.   Outcome: Resolved/Met  Note: Fall Risk Interventions:  Mobility Interventions: Bed/chair exit alarm    Mentation Interventions: Adequate sleep, hydration, pain control    Medication Interventions: Patient to call before getting OOB    Elimination Interventions: Call light in reach    History of Falls Interventions: Bed/chair exit alarm

## 2021-10-14 NOTE — DISCHARGE INSTRUCTIONS
HOSPITALIST DISCHARGE INSTRUCTIONS    NAME: Sahil Moralez-Benitez   :  1960   MRN:  [id number]     Date/Time:  10/14/2021 7:36 AM    ADMIT DATE: 10/8/2021     DISCHARGE DATE: 10/14/2021     DISCHARGE DIAGNOSIS:  UGI bleeding in settings of Cirrhosis of liver due to WALTERS with ascites POA- s/p EGD followed by  JASMIN on ,  Porotonix BID for now as recommended, S/p PRBC, S/p rocephin x 7 doses, Outpatient Close followup with Primary GI/hepatolog in [facility] recommended. DAYTON- resolved  Hypokalemia- replenished  DM  Hyperlipidemia  Chronic Thrombocytopenia  Full code      Active Problems:    GI bleeding (10/8/2021)         MEDICATIONS:  As per medication reconciliation  list  · It is important that you take the medication exactly as they are prescribed. · Keep your medication in the bottles provided by the pharmacist and keep a list of the medication names, dosages, and times to be taken in your wallet. · Do not take other medications without consulting your doctor. Pain Management: per above medications    What to do at Home    Recommended diet:  Cardiac Diet, Diabetic Diet and Low fat, Low cholesterol    Recommended activity: Activity as tolerated    If you have questions regarding the hospital related prescriptions or hospital related issues please call [facility] omar Rouse at . If you experience any of the following symptoms then please call your primary care physician or return to the emergency room if you cannot get hold of your doctor:  Fever, chills, nausea, vomiting, diarrhea, change in mentation, falling, bleeding, shortness of breath,     Follow Up: Your PCP on 10/19 as arranged at 10:20 am, your GI doctor in [facility] in 1-2 weeks for close followup as recommended      Information obtained by :  I understand that if any problems occur once I am at home I am to contact my physician.     I understand and acknowledge receipt of the instructions indicated above.                                                                                                                                            Physician's or R.N.'s Signature                                                                  Date/Time                                                                                                                                              Patient or Representative Signature                                                          Date/Time

## 2021-10-14 NOTE — PROGRESS NOTES
Bedside shift change report given to Abby Lee RN (oncoming nurse) by JORDON Marin (offgoing nurse). Report included the following information SBAR, Kardex, Intake/Output, MAR, Cardiac Rhythm NSR and Alarm Parameters . 0930: discharge instructions discussed with patient and wife. All questions answered.

## 2021-10-14 NOTE — PROGRESS NOTES
Physician Progress Note      PATIENT:               Jessika Conteh  CSN #:                  [id number]  :                       1960  ADMIT DATE:       10/8/2021 5:56 PM  DISCH DATE:        10/14/2021 10:11 AM  RESPONDING  PROVIDER #:        Trupti De La Rosa MD          QUERY TEXT:    BAMBI Correa:    Patient admitted with UGIB, noted to have Gastric Varices on CT and EGD. If possible, could you clarify the cause of the  UGIB within the progress notes and discharge summary:    The medical record reflects the following:  Risk Factors: Hx: Cirrhosis  Clinical Indicators: h/h: 7.2/21.2 ^ 6.2/17.7 ^ 5.6/16.8 ^ 6.3/18.6 ^ ...... 6.9/21.1 ^ 7.5/22.9; Occult Blood, Stool: +............. CT ABD/ PELVIS: Large gastric varices along the gastric fundus; Cirrhosis. Jose A Stevens Noted per GI: isolated gastric varix (IGV1 or gastric fundal varix) with overlying clot along the greater curve of the stomach. No active bleeding identified, though this is almost certainly the source of bleeding and at elevated risk of rebleeding off    Treatment: Serial h/h; IVF; CT ABD/PELVIS; NPO; PPI; Octreotide drip; IR consult; Transfused PRBCs; GI consult    Thank you,    Rey Juan  [facility]    Options provided:  -- GI bleeding due to Gastric varices  -- UGI bleeding in settings of Cirrhosis of liver due to WALTERS with ascites and Gastric varices  -- Other - I will add my own diagnosis  -- Disagree - Not applicable / Not valid  -- Disagree - Clinically unable to determine / Unknown  -- Refer to Clinical Documentation Reviewer    PROVIDER RESPONSE TEXT:    This patient has GI bleeding due to gastric varices.     Query created by: Dea Mcfarland on 10/12/2021 4:54 PM      Electronically signed by:  Trupti De La Rosa MD 10/14/2021 11:07 AM

## 2021-10-14 NOTE — PROGRESS NOTES
Transition of Care Plan:     RUR: 14%  Disposition: Home with spouse  Follow up appointments: PCP, GI  DME needed: None anticipated  Transportation at Discharge: Pt's spouse  Keys or means to access home:  Pt has access      IM Medicare Letter: N/A - commercial insurance   Is patient a BCPI-A Bundle:  N/A                If yes, was Bundle Letter given?:     Caregiver Contact: Pt's spouse, Umesh Willard Del-Benitez p) [phone]  Discharge Caregiver contacted prior to discharge?  CM spoke to pt and pt's spouse        Pt is ready for d/c from a  standpoint. RN informed. CM schedule a GI follow up appointment for Oct. 28, 2021 at 2:45 pm. CM added it to pt's AVS.      Care Management Interventions  PCP Verified by CM: No (Pt reports Dr in [facility], Dr. Erica Maurice (sp?) last seen on Thursday)  Mode of Transport at Discharge: Other (see comment) (Pt's spouse)  Hospital Transport Time of Discharge: 1000  Transition of Care Consult (CM Consult): Discharge Planning  Discharge Durable Medical Equipment: No  Physical Therapy Consult: No  Occupational Therapy Consult: No  Speech Therapy Consult: No  Support Systems: Spouse/Significant Other  Confirm Follow Up Transport: Self  Discharge Location  Discharge Placement: Home (Home with spouse)    Verna Boxer, Malik.[email or web address].   Care Manager [facility]  [phone]

## 2021-10-14 NOTE — PROGRESS NOTES
End of Shift Note    Bedside shift change report given to Crystal Soliz RN (oncoming nurse) by Dagoberto Gomez (offgoing nurse). Report included the following information SBAR, Kardex, ED Summary, Intake/Output, MAR and Recent Results    Shift worked:  [id number]     Shift summary and any significant changes:     Nothing overnight, HGB stable     Concerns for physician to address:  D/C today      Zone phone for oncoming shift:          Activity:  Activity Level: Up with Assistance  Number times ambulated in hallways past shift: 0  Number of times OOB to chair past shift: 0    Cardiac:   Cardiac Monitoring: Yes      Cardiac Rhythm: Sinus Rhythm    Access:   Current line(s): PIV     Genitourinary:   Urinary status: voiding    Respiratory:   O2 Device: None (Room air)  Chronic home O2 use?: NO  Incentive spirometer at bedside: YES     GI:  Last Bowel Movement Date: 10/09/21  Current diet:  DIET ONE TIME MESSAGE  ADULT DIET Regular; GI [place] (GERD/Peptic Ulcer)  Passing flatus: YES  Tolerating current diet: YES       Pain Management:   Patient states pain is manageable on current regimen: YES    Skin:  Christopher Score: 20  Interventions: float heels, increase time out of bed and PT/OT consult    Patient Safety:  Fall Score:  Total Score: 2  Interventions: bed/chair alarm, gripper socks, pt to call before getting OOB and stay with me (per policy)  High Fall Risk: Yes    Length of Stay:  Expected LOS: 4d 14h  Actual LOS: [place]

## (undated) DEVICE — GARMENT,MEDLINE,DVT,INT,CALF,MED, GEN2: Brand: MEDLINE

## (undated) DEVICE — BITE BLK ENDOSCP AD 54FR GRN POLYETH ENDOSCP W STRP SLD

## (undated) DEVICE — ENDO CARRY-ON PROCEDURE KIT INCLUDES ENZYMATIC SPONGE, GAUZE, BIOHAZARD LABEL, TRAY, LUBRICANT, DIRTY SCOPE LABEL, WATER LABEL, TRAY, DRAWSTRING PAD, AND DEFENDO 4-PIECE KIT.: Brand: ENDO CARRY-ON PROCEDURE KIT